# Patient Record
Sex: MALE | Race: WHITE | NOT HISPANIC OR LATINO | Employment: OTHER | URBAN - METROPOLITAN AREA
[De-identification: names, ages, dates, MRNs, and addresses within clinical notes are randomized per-mention and may not be internally consistent; named-entity substitution may affect disease eponyms.]

---

## 2017-02-09 ENCOUNTER — OFFICE VISIT (OUTPATIENT)
Dept: FAMILY MEDICINE CLINIC | Facility: CLINIC | Age: 57
End: 2017-02-09

## 2017-02-09 VITALS
DIASTOLIC BLOOD PRESSURE: 76 MMHG | OXYGEN SATURATION: 99 % | SYSTOLIC BLOOD PRESSURE: 122 MMHG | HEART RATE: 77 BPM | HEIGHT: 70 IN | WEIGHT: 213 LBS | BODY MASS INDEX: 30.49 KG/M2 | TEMPERATURE: 97.7 F

## 2017-02-09 DIAGNOSIS — R50.9 FEVER, UNSPECIFIED FEVER CAUSE: Primary | ICD-10-CM

## 2017-02-09 DIAGNOSIS — J06.9 ACUTE URI: ICD-10-CM

## 2017-02-09 LAB
EXPIRATION DATE: NORMAL
FLUAV AG NPH QL: NEGATIVE
FLUBV AG NPH QL: NEGATIVE
INTERNAL CONTROL: NORMAL
Lab: NORMAL

## 2017-02-09 PROCEDURE — 87804 INFLUENZA ASSAY W/OPTIC: CPT | Performed by: FAMILY MEDICINE

## 2017-02-09 PROCEDURE — 99213 OFFICE O/P EST LOW 20 MIN: CPT | Performed by: FAMILY MEDICINE

## 2017-02-09 NOTE — PROGRESS NOTES
"Subjective   Jay Guadalupe is a 56 y.o. male.     Fever    This is a new problem. The current episode started in the past 7 days. The problem occurs intermittently. The problem has been gradually improving. The maximum temperature noted was 99 to 99.9 F. The temperature was taken using an oral thermometer. Associated symptoms include muscle aches (yesterday). Pertinent negatives include no chest pain, coughing or sore throat. He has tried acetaminophen for the symptoms. The treatment provided mild relief.        The following portions of the patient's history were reviewed and updated as appropriate: allergies, current medications, past social history and problem list.    Review of Systems   Constitutional: Positive for fever.   HENT: Positive for postnasal drip. Negative for sore throat.    Respiratory: Negative for cough.    Cardiovascular: Negative for chest pain.   Musculoskeletal: Positive for back pain (low back).       Objective   Visit Vitals   • /76   • Pulse 77   • Temp 97.7 °F (36.5 °C)   • Ht 70\" (177.8 cm)   • Wt 213 lb (96.6 kg)   • SpO2 99%   • BMI 30.56 kg/m2     Physical Exam   Constitutional: He appears well-developed and well-nourished.   HENT:   Right Ear: External ear normal.   Left Ear: External ear normal.   Mouth/Throat: Oropharynx is clear and moist.   Cardiovascular: Normal rate, regular rhythm and normal heart sounds.    Pulmonary/Chest: Effort normal and breath sounds normal.   Nursing note and vitals reviewed.      Assessment/Plan   Problem List Items Addressed This Visit     None      Visit Diagnoses     Fever, unspecified fever cause    -  Primary    Relevant Orders    POC Influenza A / B (Completed)    Acute URI                 "

## 2017-07-21 ENCOUNTER — OFFICE VISIT (OUTPATIENT)
Dept: FAMILY MEDICINE CLINIC | Facility: CLINIC | Age: 57
End: 2017-07-21

## 2017-07-21 VITALS
OXYGEN SATURATION: 98 % | SYSTOLIC BLOOD PRESSURE: 110 MMHG | HEART RATE: 71 BPM | BODY MASS INDEX: 30.09 KG/M2 | DIASTOLIC BLOOD PRESSURE: 76 MMHG | WEIGHT: 210.2 LBS | HEIGHT: 70 IN | RESPIRATION RATE: 16 BRPM

## 2017-07-21 DIAGNOSIS — M62.08 DIASTASIS OF RECTUS ABDOMINIS: Primary | ICD-10-CM

## 2017-07-21 PROBLEM — N41.9 INFLAMMATORY DISEASE OF PROSTATE: Status: ACTIVE | Noted: 2017-07-21

## 2017-07-21 PROBLEM — G47.00 CANNOT SLEEP: Status: ACTIVE | Noted: 2017-07-21

## 2017-07-21 PROCEDURE — 99213 OFFICE O/P EST LOW 20 MIN: CPT | Performed by: FAMILY MEDICINE

## 2017-07-22 NOTE — PROGRESS NOTES
"Subjective   Jay Guadalupe is a 57 y.o. male.     Hernia   This is a new problem. The current episode started more than 1 month ago. The problem occurs intermittently. The problem has been unchanged. Pertinent negatives include no abdominal pain, arthralgias, chills, coughing, fever, headaches, myalgias, nausea, rash or sore throat. Exacerbated by: Sitting up or flexing the abdomen. He has tried nothing for the symptoms.    Jay has noticed a bulging of the abdomen in the midline when he sits up from bed or flexes his abdomen, or is no pain no change in bowel habits.    The following portions of the patient's history were reviewed and updated as appropriate: allergies, current medications, past social history and problem list.    Review of Systems   Constitutional: Negative.  Negative for chills and fever.   HENT: Negative for ear pain, hearing loss, postnasal drip and sore throat.    Eyes: Negative for discharge, redness and visual disturbance.   Respiratory: Negative for apnea, cough, shortness of breath and wheezing.    Gastrointestinal: Negative for abdominal pain, blood in stool, constipation, diarrhea and nausea.   Genitourinary: Negative for dysuria and hematuria.   Musculoskeletal: Negative for arthralgias, back pain, gait problem, myalgias and neck stiffness.   Skin: Negative for rash.   Allergic/Immunologic: Negative for environmental allergies and food allergies.   Neurological: Negative for dizziness, tremors, seizures, light-headedness and headaches.   Hematological: Negative for adenopathy. Does not bruise/bleed easily.   Psychiatric/Behavioral: Negative for behavioral problems, confusion, decreased concentration, dysphoric mood and sleep disturbance. The patient is not nervous/anxious.        Objective   /76  Pulse 71  Resp 16  Ht 70\" (177.8 cm)  Wt 210 lb 3.2 oz (95.3 kg)  SpO2 98%  BMI 30.16 kg/m2  Physical Exam   Constitutional: He appears well-developed and well-nourished.   HENT: "   Head: Normocephalic and atraumatic.   Eyes: Conjunctivae are normal. Pupils are equal, round, and reactive to light.   Neck: Neck supple.   Cardiovascular: Normal rate and regular rhythm.    Pulmonary/Chest: Effort normal and breath sounds normal.   Abdominal: Soft. Bowel sounds are normal.   Past stasis of the rectus muscles easily reduced palpable masses active bowel sounds   Nursing note and vitals reviewed.      Assessment/Plan   Problem List Items Addressed This Visit        Other    Diastasis of rectus abdominis - Primary          No orders of the defined types were placed in this encounter.    Reassured this is a benign condition and I recommend weight loss of 10-15 pounds and if becomes symptomatic be glad to refer him for surgical evaluation.  Otherwise asked him to return for a physical exam at a later date.

## 2017-09-19 ENCOUNTER — OFFICE VISIT (OUTPATIENT)
Dept: FAMILY MEDICINE CLINIC | Facility: CLINIC | Age: 57
End: 2017-09-19

## 2017-09-19 VITALS
SYSTOLIC BLOOD PRESSURE: 118 MMHG | OXYGEN SATURATION: 98 % | HEART RATE: 75 BPM | RESPIRATION RATE: 16 BRPM | BODY MASS INDEX: 29.89 KG/M2 | HEIGHT: 70 IN | DIASTOLIC BLOOD PRESSURE: 78 MMHG | WEIGHT: 208.8 LBS

## 2017-09-19 DIAGNOSIS — L03.031 CELLULITIS OF TOE OF RIGHT FOOT: Primary | ICD-10-CM

## 2017-09-19 PROCEDURE — 99213 OFFICE O/P EST LOW 20 MIN: CPT | Performed by: FAMILY MEDICINE

## 2017-09-19 RX ORDER — CEPHALEXIN 500 MG/1
500 CAPSULE ORAL 3 TIMES DAILY
Qty: 30 CAPSULE | Refills: 0 | Status: SHIPPED | OUTPATIENT
Start: 2017-09-19 | End: 2018-01-22

## 2017-09-19 NOTE — PROGRESS NOTES
"Subjective   Jay Guadalupe is a 57 y.o. male.     Toe Pain    The incident occurred 5 to 7 days ago. There was no injury mechanism. The pain is present in the left toes. The quality of the pain is described as aching. The pain is moderate. The pain has been fluctuating since onset. Pertinent negatives include no inability to bear weight, numbness or tingling. He has tried elevation for the symptoms. The treatment provided no relief.        The following portions of the patient's history were reviewed and updated as appropriate: allergies, current medications, past social history and problem list.    Review of Systems   HENT: Negative for congestion, sinus pressure and sore throat.    Respiratory: Negative for cough and shortness of breath.    Cardiovascular: Negative for chest pain and leg swelling.   Skin: Positive for color change and rash.   Neurological: Negative for tingling and numbness.       Objective   /78  Pulse 75  Resp 16  Ht 70\" (177.8 cm)  Wt 208 lb 12.8 oz (94.7 kg)  SpO2 98%  BMI 29.96 kg/m2  Physical Exam   Constitutional: He appears well-developed and well-nourished.   HENT:   Head: Normocephalic and atraumatic.   Eyes: Conjunctivae are normal. Pupils are equal, round, and reactive to light.   Neck: Neck supple.   Cardiovascular: Normal rate and regular rhythm.    Pulmonary/Chest: Effort normal and breath sounds normal.   Skin:   Swelling and redness at the base of the great toenail there is some pus formation in the corner no drainage or red streaks   Nursing note and vitals reviewed.      Assessment/Plan   Problem List Items Addressed This Visit     None      Visit Diagnoses     Cellulitis of toe of right foot    -  Primary          New Medications Ordered This Visit   Medications   • cephalexin (KEFLEX) 500 MG capsule     Sig: Take 1 capsule by mouth 3 (Three) Times a Day.     Dispense:  30 capsule     Refill:  0       Warm soaks twice a day return to clinic if not improving in 2-3 " days for possible incision and drainage.

## 2018-01-22 ENCOUNTER — OFFICE VISIT (OUTPATIENT)
Dept: FAMILY MEDICINE CLINIC | Facility: CLINIC | Age: 58
End: 2018-01-22

## 2018-01-22 VITALS — HEART RATE: 72 BPM | DIASTOLIC BLOOD PRESSURE: 80 MMHG | SYSTOLIC BLOOD PRESSURE: 120 MMHG

## 2018-01-22 DIAGNOSIS — J40 BRONCHITIS: Primary | ICD-10-CM

## 2018-01-22 PROCEDURE — 99213 OFFICE O/P EST LOW 20 MIN: CPT | Performed by: FAMILY MEDICINE

## 2018-01-22 RX ORDER — BENZONATATE 200 MG/1
200 CAPSULE ORAL 3 TIMES DAILY PRN
Qty: 30 CAPSULE | Refills: 0 | Status: ON HOLD | OUTPATIENT
Start: 2018-01-22 | End: 2021-04-29

## 2018-01-22 RX ORDER — DOXYCYCLINE HYCLATE 100 MG/1
100 CAPSULE ORAL 2 TIMES DAILY
Qty: 20 CAPSULE | Refills: 0 | Status: ON HOLD | OUTPATIENT
Start: 2018-01-22 | End: 2021-04-29

## 2018-01-22 RX ORDER — FLUTICASONE PROPIONATE 110 UG/1
1 AEROSOL, METERED RESPIRATORY (INHALATION)
Qty: 1 INHALER | Refills: 0 | Status: ON HOLD | OUTPATIENT
Start: 2018-01-22 | End: 2021-04-29

## 2018-01-22 NOTE — PROGRESS NOTES
Subjective   Jay Guadalupe is a 57 y.o. male.     Cough   This is a new problem. The current episode started more than 1 month ago. The problem has been unchanged. The problem occurs every few minutes. The cough is productive of sputum. Pertinent negatives include no chest pain, chills, fever, hemoptysis, sore throat, shortness of breath or wheezing. He has tried OTC cough suppressant for the symptoms. The treatment provided no relief. His past medical history is significant for bronchitis. There is no history of environmental allergies.      Call for this persisted for 6 weeks.  Productive at times the cough so little worse at night no fever chills sweats no purulent sputum.  The following portions of the patient's history were reviewed and updated as appropriate: allergies, current medications, past social history and problem list.    Review of Systems   Constitutional: Negative for chills, fatigue and fever.   HENT: Negative.  Negative for congestion and sore throat.    Respiratory: Positive for cough. Negative for hemoptysis, chest tightness, shortness of breath and wheezing.    Cardiovascular: Negative for chest pain.   Gastrointestinal: Negative for nausea.   Genitourinary: Negative for dysuria and hematuria.   Musculoskeletal: Negative for arthralgias.   Allergic/Immunologic: Negative for environmental allergies and food allergies.       Objective   /80  Pulse 72  Physical Exam   Constitutional: He is oriented to person, place, and time. He appears well-developed and well-nourished. He is cooperative.   HENT:   Head: Normocephalic.   Right Ear: External ear normal.   Left Ear: External ear normal.   Nose: Nose normal.   Mouth/Throat: Oropharynx is clear and moist.   Clear postnasal drip   Eyes: Conjunctivae are normal. Pupils are equal, round, and reactive to light. No scleral icterus.   Neck: Neck supple. Carotid bruit is not present. No thyromegaly present.   Cardiovascular: Normal rate, regular  rhythm and normal heart sounds.    Pulmonary/Chest: Effort normal.   Dry staccato upper airway rhonchi   Abdominal: There is no hepatosplenomegaly.   Musculoskeletal: Normal range of motion.   Lymphadenopathy:     He has no cervical adenopathy.   Neurological: He is alert and oriented to person, place, and time.   No focal deficits no lateralizing signs   Skin: Skin is warm and dry. No rash noted.   Psychiatric: He has a normal mood and affect. Cognition and memory are normal.   Nursing note and vitals reviewed.      Assessment/Plan   Problem List Items Addressed This Visit     None      Visit Diagnoses     Bronchitis    -  Primary    Relevant Medications    benzonatate (TESSALON) 200 MG capsule    fluticasone (FLOVENT HFA) 110 MCG/ACT inhaler          New Medications Ordered This Visit   Medications   • doxycycline (VIBRAMYCIN) 100 MG capsule     Sig: Take 1 capsule by mouth 2 (Two) Times a Day.     Dispense:  20 capsule     Refill:  0   • benzonatate (TESSALON) 200 MG capsule     Sig: Take 1 capsule by mouth 3 (Three) Times a Day As Needed for Cough.     Dispense:  30 capsule     Refill:  0   • fluticasone (FLOVENT HFA) 110 MCG/ACT inhaler     Sig: Inhale 1 puff 2 (Two) Times a Day. Rinse mouth after use     Dispense:  1 inhaler     Refill:  0     Return to clinic if symptoms persist may need to consider chest x-ray and further workup.

## 2019-11-24 ENCOUNTER — CLINICAL SUPPORT (OUTPATIENT)
Dept: FAMILY MEDICINE CLINIC | Facility: CLINIC | Age: 59
End: 2019-11-24

## 2019-11-24 DIAGNOSIS — Z23 NEED FOR VACCINATION: Primary | ICD-10-CM

## 2019-11-24 PROCEDURE — 90471 IMMUNIZATION ADMIN: CPT | Performed by: FAMILY MEDICINE

## 2019-11-24 PROCEDURE — 90472 IMMUNIZATION ADMIN EACH ADD: CPT | Performed by: FAMILY MEDICINE

## 2019-11-24 PROCEDURE — 90674 CCIIV4 VAC NO PRSV 0.5 ML IM: CPT | Performed by: FAMILY MEDICINE

## 2019-11-24 PROCEDURE — 90632 HEPA VACCINE ADULT IM: CPT | Performed by: FAMILY MEDICINE

## 2020-08-03 ENCOUNTER — APPOINTMENT (OUTPATIENT)
Dept: PREADMISSION TESTING | Facility: HOSPITAL | Age: 60
End: 2020-08-03

## 2020-08-07 ENCOUNTER — APPOINTMENT (OUTPATIENT)
Dept: PREADMISSION TESTING | Facility: HOSPITAL | Age: 60
End: 2020-08-07

## 2021-04-29 ENCOUNTER — HOSPITAL ENCOUNTER (INPATIENT)
Facility: HOSPITAL | Age: 61
LOS: 1 days | Discharge: HOME | End: 2021-04-30
Attending: INTERNAL MEDICINE | Admitting: INTERNAL MEDICINE

## 2021-04-29 DIAGNOSIS — I21.21 ST ELEVATION MYOCARDIAL INFARCTION INVOLVING LEFT CIRCUMFLEX CORONARY ARTERY (HCC): Primary | ICD-10-CM

## 2021-04-29 PROBLEM — E78.5 DYSLIPIDEMIA: Status: ACTIVE | Noted: 2021-04-29

## 2021-04-29 PROBLEM — I21.3 STEMI (ST ELEVATION MYOCARDIAL INFARCTION): Status: ACTIVE | Noted: 2021-04-29

## 2021-04-29 PROBLEM — I25.810 CAD (CORONARY ARTERY DISEASE) OF ARTERY BYPASS GRAFT: Status: ACTIVE | Noted: 2021-04-29

## 2021-04-29 PROBLEM — G47.00 CANNOT SLEEP: Status: RESOLVED | Noted: 2017-07-21 | Resolved: 2021-04-29

## 2021-04-29 LAB
ALBUMIN SERPL-MCNC: 3.7 G/DL (ref 3.5–5.2)
ALBUMIN/GLOB SERPL: 1.5 G/DL
ALP SERPL-CCNC: 74 U/L (ref 39–117)
ALT SERPL W P-5'-P-CCNC: 23 U/L (ref 1–41)
ANION GAP SERPL CALCULATED.3IONS-SCNC: 6 MMOL/L (ref 5–15)
AST SERPL-CCNC: 61 U/L (ref 1–40)
BILIRUB SERPL-MCNC: 0.3 MG/DL (ref 0–1.2)
BUN SERPL-MCNC: 16 MG/DL (ref 8–23)
BUN/CREAT SERPL: 16.3 (ref 7–25)
CALCIUM SPEC-SCNC: 8.4 MG/DL (ref 8.6–10.5)
CHLORIDE SERPL-SCNC: 105 MMOL/L (ref 98–107)
CHOLEST SERPL-MCNC: 150 MG/DL (ref 0–200)
CO2 SERPL-SCNC: 24 MMOL/L (ref 22–29)
CREAT BLDA-MCNC: 0.8 MG/DL (ref 0.6–1.3)
CREAT SERPL-MCNC: 0.98 MG/DL (ref 0.76–1.27)
GFR SERPL CREATININE-BSD FRML MDRD: 78 ML/MIN/1.73
GLOBULIN UR ELPH-MCNC: 2.5 GM/DL
GLUCOSE SERPL-MCNC: 121 MG/DL (ref 65–99)
HBA1C MFR BLD: 5 % (ref 4.8–5.6)
HDLC SERPL-MCNC: 35 MG/DL (ref 40–60)
LDLC SERPL CALC-MCNC: 108 MG/DL (ref 0–100)
LDLC/HDLC SERPL: 3.14 {RATIO}
MAGNESIUM SERPL-MCNC: 2 MG/DL (ref 1.6–2.4)
POTASSIUM SERPL-SCNC: 4.9 MMOL/L (ref 3.5–5.2)
PROT SERPL-MCNC: 6.2 G/DL (ref 6–8.5)
SARS-COV-2 RDRP RESP QL NAA+PROBE: NORMAL
SODIUM SERPL-SCNC: 135 MMOL/L (ref 136–145)
TRIGL SERPL-MCNC: 26 MG/DL (ref 0–150)
TROPONIN T SERPL-MCNC: 1.04 NG/ML (ref 0–0.03)
VLDLC SERPL-MCNC: 7 MG/DL (ref 5–40)

## 2021-04-29 PROCEDURE — C1887 CATHETER, GUIDING: HCPCS | Performed by: INTERNAL MEDICINE

## 2021-04-29 PROCEDURE — 82565 ASSAY OF CREATININE: CPT

## 2021-04-29 PROCEDURE — 92928 PRQ TCAT PLMT NTRAC ST 1 LES: CPT | Performed by: INTERNAL MEDICINE

## 2021-04-29 PROCEDURE — C1894 INTRO/SHEATH, NON-LASER: HCPCS | Performed by: INTERNAL MEDICINE

## 2021-04-29 PROCEDURE — C1757 CATH, THROMBECTOMY/EMBOLECT: HCPCS | Performed by: INTERNAL MEDICINE

## 2021-04-29 PROCEDURE — 25010000002 MIDAZOLAM PER 1 MG: Performed by: INTERNAL MEDICINE

## 2021-04-29 PROCEDURE — 25010000002 CANGRELOR TETRASODIUM 50 MG RECONSTITUTED SOLUTION 1 EACH VIAL: Performed by: INTERNAL MEDICINE

## 2021-04-29 PROCEDURE — C1874 STENT, COATED/COV W/DEL SYS: HCPCS | Performed by: INTERNAL MEDICINE

## 2021-04-29 PROCEDURE — 83735 ASSAY OF MAGNESIUM: CPT | Performed by: INTERNAL MEDICINE

## 2021-04-29 PROCEDURE — 25010000002 FENTANYL CITRATE (PF) 100 MCG/2ML SOLUTION: Performed by: INTERNAL MEDICINE

## 2021-04-29 PROCEDURE — C9460 INJECTION, CANGRELOR: HCPCS | Performed by: INTERNAL MEDICINE

## 2021-04-29 PROCEDURE — C1725 CATH, TRANSLUMIN NON-LASER: HCPCS | Performed by: INTERNAL MEDICINE

## 2021-04-29 PROCEDURE — 93458 L HRT ARTERY/VENTRICLE ANGIO: CPT | Performed by: INTERNAL MEDICINE

## 2021-04-29 PROCEDURE — 80053 COMPREHEN METABOLIC PANEL: CPT | Performed by: INTERNAL MEDICINE

## 2021-04-29 PROCEDURE — 4A023N7 MEASUREMENT OF CARDIAC SAMPLING AND PRESSURE, LEFT HEART, PERCUTANEOUS APPROACH: ICD-10-PCS | Performed by: INTERNAL MEDICINE

## 2021-04-29 PROCEDURE — 027135Z DILATION OF CORONARY ARTERY, TWO ARTERIES WITH TWO DRUG-ELUTING INTRALUMINAL DEVICES, PERCUTANEOUS APPROACH: ICD-10-PCS | Performed by: INTERNAL MEDICINE

## 2021-04-29 PROCEDURE — 02C03ZZ EXTIRPATION OF MATTER FROM CORONARY ARTERY, ONE ARTERY, PERCUTANEOUS APPROACH: ICD-10-PCS | Performed by: INTERNAL MEDICINE

## 2021-04-29 PROCEDURE — B2151ZZ FLUOROSCOPY OF LEFT HEART USING LOW OSMOLAR CONTRAST: ICD-10-PCS | Performed by: INTERNAL MEDICINE

## 2021-04-29 PROCEDURE — C1769 GUIDE WIRE: HCPCS | Performed by: INTERNAL MEDICINE

## 2021-04-29 PROCEDURE — 83036 HEMOGLOBIN GLYCOSYLATED A1C: CPT | Performed by: INTERNAL MEDICINE

## 2021-04-29 PROCEDURE — 93005 ELECTROCARDIOGRAM TRACING: CPT | Performed by: INTERNAL MEDICINE

## 2021-04-29 PROCEDURE — 92941 PRQ TRLML REVSC TOT OCCL AMI: CPT | Performed by: INTERNAL MEDICINE

## 2021-04-29 PROCEDURE — 80061 LIPID PANEL: CPT | Performed by: INTERNAL MEDICINE

## 2021-04-29 PROCEDURE — 25010000002 HEPARIN (PORCINE) PER 1000 UNITS: Performed by: INTERNAL MEDICINE

## 2021-04-29 PROCEDURE — 99223 1ST HOSP IP/OBS HIGH 75: CPT | Performed by: INTERNAL MEDICINE

## 2021-04-29 PROCEDURE — C9600 PERC DRUG-EL COR STENT SING: HCPCS | Performed by: INTERNAL MEDICINE

## 2021-04-29 PROCEDURE — C1760 CLOSURE DEV, VASC: HCPCS | Performed by: INTERNAL MEDICINE

## 2021-04-29 PROCEDURE — 87635 SARS-COV-2 COVID-19 AMP PRB: CPT | Performed by: INTERNAL MEDICINE

## 2021-04-29 PROCEDURE — 84484 ASSAY OF TROPONIN QUANT: CPT | Performed by: INTERNAL MEDICINE

## 2021-04-29 PROCEDURE — 0 IOPAMIDOL PER 1 ML: Performed by: INTERNAL MEDICINE

## 2021-04-29 PROCEDURE — B2111ZZ FLUOROSCOPY OF MULTIPLE CORONARY ARTERIES USING LOW OSMOLAR CONTRAST: ICD-10-PCS | Performed by: INTERNAL MEDICINE

## 2021-04-29 PROCEDURE — C9606 PERC D-E COR REVASC W AMI S: HCPCS | Performed by: INTERNAL MEDICINE

## 2021-04-29 DEVICE — XIENCE SIERRA™ EVEROLIMUS ELUTING CORONARY STENT SYSTEM 2.25 MM X 23 MM / RAPID-EXCHANGE
Type: IMPLANTABLE DEVICE | Status: FUNCTIONAL
Brand: XIENCE SIERRA™

## 2021-04-29 DEVICE — XIENCE SIERRA™ EVEROLIMUS ELUTING CORONARY STENT SYSTEM 3.00 MM X 23 MM / RAPID-EXCHANGE
Type: IMPLANTABLE DEVICE | Status: FUNCTIONAL
Brand: XIENCE SIERRA™

## 2021-04-29 RX ORDER — FENTANYL CITRATE 50 UG/ML
INJECTION, SOLUTION INTRAMUSCULAR; INTRAVENOUS AS NEEDED
Status: DISCONTINUED | OUTPATIENT
Start: 2021-04-29 | End: 2021-04-29 | Stop reason: HOSPADM

## 2021-04-29 RX ORDER — ONDANSETRON 2 MG/ML
4 INJECTION INTRAMUSCULAR; INTRAVENOUS EVERY 6 HOURS PRN
Status: DISCONTINUED | OUTPATIENT
Start: 2021-04-29 | End: 2021-04-30 | Stop reason: HOSPADM

## 2021-04-29 RX ORDER — ACETAMINOPHEN 325 MG/1
650 TABLET ORAL EVERY 4 HOURS PRN
Status: DISCONTINUED | OUTPATIENT
Start: 2021-04-29 | End: 2021-04-30 | Stop reason: HOSPADM

## 2021-04-29 RX ORDER — ALUMINA, MAGNESIA, AND SIMETHICONE 2400; 2400; 240 MG/30ML; MG/30ML; MG/30ML
15 SUSPENSION ORAL EVERY 6 HOURS PRN
Status: DISCONTINUED | OUTPATIENT
Start: 2021-04-29 | End: 2021-04-30 | Stop reason: HOSPADM

## 2021-04-29 RX ORDER — ONDANSETRON 4 MG/1
4 TABLET, FILM COATED ORAL EVERY 6 HOURS PRN
Status: DISCONTINUED | OUTPATIENT
Start: 2021-04-29 | End: 2021-04-30 | Stop reason: HOSPADM

## 2021-04-29 RX ORDER — SODIUM CHLORIDE 9 MG/ML
100 INJECTION, SOLUTION INTRAVENOUS CONTINUOUS
Status: ACTIVE | OUTPATIENT
Start: 2021-04-29 | End: 2021-04-29

## 2021-04-29 RX ORDER — MIDAZOLAM HYDROCHLORIDE 1 MG/ML
INJECTION INTRAMUSCULAR; INTRAVENOUS AS NEEDED
Status: DISCONTINUED | OUTPATIENT
Start: 2021-04-29 | End: 2021-04-29 | Stop reason: HOSPADM

## 2021-04-29 RX ORDER — HEPARIN SODIUM 1000 [USP'U]/ML
INJECTION, SOLUTION INTRAVENOUS; SUBCUTANEOUS AS NEEDED
Status: DISCONTINUED | OUTPATIENT
Start: 2021-04-29 | End: 2021-04-29 | Stop reason: HOSPADM

## 2021-04-29 RX ORDER — LIDOCAINE HYDROCHLORIDE 10 MG/ML
INJECTION, SOLUTION EPIDURAL; INFILTRATION; INTRACAUDAL; PERINEURAL AS NEEDED
Status: DISCONTINUED | OUTPATIENT
Start: 2021-04-29 | End: 2021-04-29 | Stop reason: HOSPADM

## 2021-04-29 RX ORDER — ALPRAZOLAM 0.25 MG/1
0.25 TABLET ORAL 3 TIMES DAILY PRN
Status: DISCONTINUED | OUTPATIENT
Start: 2021-04-29 | End: 2021-04-30 | Stop reason: HOSPADM

## 2021-04-29 RX ORDER — ASPIRIN 81 MG/1
81 TABLET, CHEWABLE ORAL DAILY
Status: DISCONTINUED | OUTPATIENT
Start: 2021-04-29 | End: 2021-04-30 | Stop reason: HOSPADM

## 2021-04-29 RX ADMIN — SODIUM CHLORIDE 100 ML/HR: 9 INJECTION, SOLUTION INTRAVENOUS at 14:24

## 2021-04-29 RX ADMIN — TICAGRELOR 90 MG: 90 TABLET ORAL at 20:24

## 2021-04-29 RX ADMIN — METOPROLOL TARTRATE 12.5 MG: 25 TABLET, FILM COATED ORAL at 17:09

## 2021-04-29 RX ADMIN — ASPIRIN 81 MG: 81 TABLET, CHEWABLE ORAL at 14:25

## 2021-04-30 ENCOUNTER — NURSE TRIAGE (OUTPATIENT)
Dept: CALL CENTER | Facility: HOSPITAL | Age: 61
End: 2021-04-30

## 2021-04-30 ENCOUNTER — READMISSION MANAGEMENT (OUTPATIENT)
Dept: CALL CENTER | Facility: HOSPITAL | Age: 61
End: 2021-04-30

## 2021-04-30 ENCOUNTER — TRANSCRIBE ORDERS (OUTPATIENT)
Dept: CARDIAC REHAB | Facility: HOSPITAL | Age: 61
End: 2021-04-30

## 2021-04-30 ENCOUNTER — APPOINTMENT (OUTPATIENT)
Dept: CARDIOLOGY | Facility: HOSPITAL | Age: 61
End: 2021-04-30

## 2021-04-30 VITALS
DIASTOLIC BLOOD PRESSURE: 63 MMHG | OXYGEN SATURATION: 99 % | SYSTOLIC BLOOD PRESSURE: 105 MMHG | TEMPERATURE: 97.8 F | WEIGHT: 185 LBS | RESPIRATION RATE: 18 BRPM | HEART RATE: 62 BPM | HEIGHT: 70 IN | BODY MASS INDEX: 26.48 KG/M2

## 2021-04-30 DIAGNOSIS — I21.3 ST ELEVATION MYOCARDIAL INFARCTION (STEMI), UNSPECIFIED ARTERY (HCC): Primary | ICD-10-CM

## 2021-04-30 PROBLEM — E78.2 MIXED HYPERLIPIDEMIA: Status: ACTIVE | Noted: 2021-04-30

## 2021-04-30 LAB
ANION GAP SERPL CALCULATED.3IONS-SCNC: 9 MMOL/L (ref 5–15)
ASCENDING AORTA: 3 CM
BH CV ECHO MEAS - AO MAX PG (FULL): 3 MMHG
BH CV ECHO MEAS - AO MAX PG: 6.7 MMHG
BH CV ECHO MEAS - AO MEAN PG (FULL): 1.5 MMHG
BH CV ECHO MEAS - AO MEAN PG: 3.3 MMHG
BH CV ECHO MEAS - AO ROOT AREA (BSA CORRECTED): 1.7
BH CV ECHO MEAS - AO ROOT AREA: 9.7 CM^2
BH CV ECHO MEAS - AO ROOT DIAM: 3.5 CM
BH CV ECHO MEAS - AO V2 MAX: 129.2 CM/SEC
BH CV ECHO MEAS - AO V2 MEAN: 86 CM/SEC
BH CV ECHO MEAS - AO V2 VTI: 27.5 CM
BH CV ECHO MEAS - ASC AORTA: 3 CM
BH CV ECHO MEAS - AVA(I,A): 2.6 CM^2
BH CV ECHO MEAS - AVA(I,D): 2.6 CM^2
BH CV ECHO MEAS - AVA(V,A): 2.5 CM^2
BH CV ECHO MEAS - AVA(V,D): 2.5 CM^2
BH CV ECHO MEAS - BSA(HAYCOCK): 2 M^2
BH CV ECHO MEAS - BSA: 2 M^2
BH CV ECHO MEAS - BZI_BMI: 26.5 KILOGRAMS/M^2
BH CV ECHO MEAS - BZI_METRIC_HEIGHT: 177.8 CM
BH CV ECHO MEAS - BZI_METRIC_WEIGHT: 83.9 KG
BH CV ECHO MEAS - EDV(CUBED): 63.1 ML
BH CV ECHO MEAS - EDV(MOD-SP2): 74 ML
BH CV ECHO MEAS - EDV(MOD-SP4): 81 ML
BH CV ECHO MEAS - EDV(TEICH): 69.2 ML
BH CV ECHO MEAS - EF(CUBED): 37 %
BH CV ECHO MEAS - EF(MOD-SP2): 50 %
BH CV ECHO MEAS - EF(MOD-SP4): 59.3 %
BH CV ECHO MEAS - EF(TEICH): 30.8 %
BH CV ECHO MEAS - ESV(CUBED): 39.8 ML
BH CV ECHO MEAS - ESV(MOD-SP2): 37 ML
BH CV ECHO MEAS - ESV(MOD-SP4): 33 ML
BH CV ECHO MEAS - ESV(TEICH): 47.9 ML
BH CV ECHO MEAS - FS: 14.3 %
BH CV ECHO MEAS - IVS/LVPW: 1
BH CV ECHO MEAS - IVSD: 1.2 CM
BH CV ECHO MEAS - LA DIMENSION: 3.4 CM
BH CV ECHO MEAS - LA/AO: 0.96
BH CV ECHO MEAS - LAD MAJOR: 5.6 CM
BH CV ECHO MEAS - LAT PEAK E' VEL: 12.2 CM/SEC
BH CV ECHO MEAS - LATERAL E/E' RATIO: 6.1
BH CV ECHO MEAS - LV DIASTOLIC VOL/BSA (35-75): 40.1 ML/M^2
BH CV ECHO MEAS - LV MASS(C)D: 165.7 GRAMS
BH CV ECHO MEAS - LV MASS(C)DI: 82.1 GRAMS/M^2
BH CV ECHO MEAS - LV MAX PG: 3.7 MMHG
BH CV ECHO MEAS - LV MEAN PG: 1.9 MMHG
BH CV ECHO MEAS - LV SYSTOLIC VOL/BSA (12-30): 16.3 ML/M^2
BH CV ECHO MEAS - LV V1 MAX: 95.5 CM/SEC
BH CV ECHO MEAS - LV V1 MEAN: 63.2 CM/SEC
BH CV ECHO MEAS - LV V1 VTI: 20.7 CM
BH CV ECHO MEAS - LVIDD: 4 CM
BH CV ECHO MEAS - LVIDS: 3.4 CM
BH CV ECHO MEAS - LVLD AP2: 8.4 CM
BH CV ECHO MEAS - LVLD AP4: 8 CM
BH CV ECHO MEAS - LVLS AP2: 6.5 CM
BH CV ECHO MEAS - LVLS AP4: 6.8 CM
BH CV ECHO MEAS - LVOT AREA (M): 3.5 CM^2
BH CV ECHO MEAS - LVOT AREA: 3.4 CM^2
BH CV ECHO MEAS - LVOT DIAM: 2.1 CM
BH CV ECHO MEAS - LVPWD: 1.2 CM
BH CV ECHO MEAS - MED PEAK E' VEL: 8.4 CM/SEC
BH CV ECHO MEAS - MEDIAL E/E' RATIO: 8.8
BH CV ECHO MEAS - MV A MAX VEL: 53.8 CM/SEC
BH CV ECHO MEAS - MV DEC SLOPE: 221.2 CM/SEC^2
BH CV ECHO MEAS - MV DEC TIME: 0.16 SEC
BH CV ECHO MEAS - MV E MAX VEL: 76 CM/SEC
BH CV ECHO MEAS - MV E/A: 1.4
BH CV ECHO MEAS - MV P1/2T MAX VEL: 81.4 CM/SEC
BH CV ECHO MEAS - MV P1/2T: 107.9 MSEC
BH CV ECHO MEAS - MVA P1/2T LCG: 2.7 CM^2
BH CV ECHO MEAS - MVA(P1/2T): 2 CM^2
BH CV ECHO MEAS - PA ACC SLOPE: 416 CM/SEC^2
BH CV ECHO MEAS - PA ACC TIME: 0.14 SEC
BH CV ECHO MEAS - PA MAX PG: 2.4 MMHG
BH CV ECHO MEAS - PA PR(ACCEL): 15.4 MMHG
BH CV ECHO MEAS - PA V2 MAX: 78.2 CM/SEC
BH CV ECHO MEAS - SI(AO): 132.6 ML/M^2
BH CV ECHO MEAS - SI(CUBED): 11.5 ML/M^2
BH CV ECHO MEAS - SI(LVOT): 34.7 ML/M^2
BH CV ECHO MEAS - SI(MOD-SP2): 18.3 ML/M^2
BH CV ECHO MEAS - SI(MOD-SP4): 23.8 ML/M^2
BH CV ECHO MEAS - SI(TEICH): 10.6 ML/M^2
BH CV ECHO MEAS - SV(AO): 267.8 ML
BH CV ECHO MEAS - SV(CUBED): 23.3 ML
BH CV ECHO MEAS - SV(LVOT): 70.1 ML
BH CV ECHO MEAS - SV(MOD-SP2): 37 ML
BH CV ECHO MEAS - SV(MOD-SP4): 48 ML
BH CV ECHO MEAS - SV(TEICH): 21.3 ML
BH CV ECHO MEAS - TAPSE (>1.6): 1.9 CM
BH CV ECHO MEASUREMENTS AVERAGE E/E' RATIO: 7.38
BH CV XLRA - RV BASE: 3.3 CM
BUN SERPL-MCNC: 12 MG/DL (ref 8–23)
BUN/CREAT SERPL: 13.3 (ref 7–25)
CALCIUM SPEC-SCNC: 8.4 MG/DL (ref 8.6–10.5)
CHLORIDE SERPL-SCNC: 106 MMOL/L (ref 98–107)
CO2 SERPL-SCNC: 24 MMOL/L (ref 22–29)
CREAT SERPL-MCNC: 0.9 MG/DL (ref 0.76–1.27)
DEPRECATED RDW RBC AUTO: 42.5 FL (ref 37–54)
ERYTHROCYTE [DISTWIDTH] IN BLOOD BY AUTOMATED COUNT: 12.4 % (ref 12.3–15.4)
GFR SERPL CREATININE-BSD FRML MDRD: 86 ML/MIN/1.73
GLUCOSE SERPL-MCNC: 107 MG/DL (ref 65–99)
HCT VFR BLD AUTO: 40.8 % (ref 37.5–51)
HGB BLD-MCNC: 13.1 G/DL (ref 13–17.7)
LEFT ATRIUM VOLUME INDEX: 19.8 ML/M^2
LEFT ATRIUM VOLUME: 40 ML
LV EF 2D ECHO EST: 55 %
MAXIMAL PREDICTED HEART RATE: 158 BPM
MCH RBC QN AUTO: 29.8 PG (ref 26.6–33)
MCHC RBC AUTO-ENTMCNC: 32.1 G/DL (ref 31.5–35.7)
MCV RBC AUTO: 92.7 FL (ref 79–97)
PLATELET # BLD AUTO: 224 10*3/MM3 (ref 140–450)
PMV BLD AUTO: 10.3 FL (ref 6–12)
POTASSIUM SERPL-SCNC: 4 MMOL/L (ref 3.5–5.2)
RBC # BLD AUTO: 4.4 10*6/MM3 (ref 4.14–5.8)
SODIUM SERPL-SCNC: 139 MMOL/L (ref 136–145)
STRESS TARGET HR: 134 BPM
TROPONIN T SERPL-MCNC: 2.28 NG/ML (ref 0–0.03)
WBC # BLD AUTO: 10.89 10*3/MM3 (ref 3.4–10.8)

## 2021-04-30 PROCEDURE — 93005 ELECTROCARDIOGRAM TRACING: CPT | Performed by: INTERNAL MEDICINE

## 2021-04-30 PROCEDURE — 85027 COMPLETE CBC AUTOMATED: CPT | Performed by: INTERNAL MEDICINE

## 2021-04-30 PROCEDURE — 84484 ASSAY OF TROPONIN QUANT: CPT | Performed by: INTERNAL MEDICINE

## 2021-04-30 PROCEDURE — 99239 HOSP IP/OBS DSCHRG MGMT >30: CPT | Performed by: INTERNAL MEDICINE

## 2021-04-30 PROCEDURE — 80048 BASIC METABOLIC PNL TOTAL CA: CPT | Performed by: INTERNAL MEDICINE

## 2021-04-30 PROCEDURE — 93306 TTE W/DOPPLER COMPLETE: CPT | Performed by: INTERNAL MEDICINE

## 2021-04-30 PROCEDURE — 93306 TTE W/DOPPLER COMPLETE: CPT

## 2021-04-30 PROCEDURE — 93010 ELECTROCARDIOGRAM REPORT: CPT | Performed by: INTERNAL MEDICINE

## 2021-04-30 RX ORDER — ASPIRIN 81 MG/1
81 TABLET, CHEWABLE ORAL DAILY
Qty: 100 TABLET | Refills: 3 | Status: SHIPPED | OUTPATIENT
Start: 2021-04-30 | End: 2021-05-01

## 2021-04-30 RX ORDER — METOPROLOL SUCCINATE 25 MG/1
12.5 TABLET, EXTENDED RELEASE ORAL ONCE
Status: COMPLETED | OUTPATIENT
Start: 2021-04-30 | End: 2021-04-30

## 2021-04-30 RX ORDER — RAMIPRIL 1.25 MG/1
1.25 CAPSULE ORAL
Qty: 30 CAPSULE | Refills: 5 | Status: SHIPPED | OUTPATIENT
Start: 2021-04-30 | End: 2021-05-01

## 2021-04-30 RX ORDER — ROSUVASTATIN CALCIUM 20 MG/1
20 TABLET, COATED ORAL NIGHTLY
Qty: 90 TABLET | Refills: 1 | Status: SHIPPED | OUTPATIENT
Start: 2021-04-30 | End: 2021-05-01

## 2021-04-30 RX ORDER — METOPROLOL SUCCINATE 25 MG/1
25 TABLET, EXTENDED RELEASE ORAL
Status: DISCONTINUED | OUTPATIENT
Start: 2021-05-01 | End: 2021-04-30 | Stop reason: HOSPADM

## 2021-04-30 RX ORDER — METOPROLOL SUCCINATE 25 MG/1
25 TABLET, EXTENDED RELEASE ORAL
Status: DISCONTINUED | OUTPATIENT
Start: 2021-04-30 | End: 2021-04-30

## 2021-04-30 RX ORDER — NITROGLYCERIN 0.4 MG/1
TABLET SUBLINGUAL
Qty: 100 TABLET | Refills: 1 | Status: SHIPPED | OUTPATIENT
Start: 2021-04-30 | End: 2021-05-01 | Stop reason: SDUPTHER

## 2021-04-30 RX ORDER — ROSUVASTATIN CALCIUM 20 MG/1
20 TABLET, COATED ORAL NIGHTLY
Status: DISCONTINUED | OUTPATIENT
Start: 2021-04-30 | End: 2021-04-30 | Stop reason: HOSPADM

## 2021-04-30 RX ORDER — RAMIPRIL 1.25 MG/1
1.25 CAPSULE ORAL
Status: DISCONTINUED | OUTPATIENT
Start: 2021-04-30 | End: 2021-04-30 | Stop reason: HOSPADM

## 2021-04-30 RX ORDER — METOPROLOL SUCCINATE 25 MG/1
25 TABLET, EXTENDED RELEASE ORAL
Qty: 30 TABLET | Refills: 5 | Status: SHIPPED | OUTPATIENT
Start: 2021-05-01 | End: 2021-05-01

## 2021-04-30 RX ORDER — METOPROLOL SUCCINATE 25 MG/1
25 TABLET, EXTENDED RELEASE ORAL
Qty: 30 TABLET | Refills: 5 | Status: SHIPPED | OUTPATIENT
Start: 2021-04-30 | End: 2021-05-01

## 2021-04-30 RX ADMIN — TICAGRELOR 90 MG: 90 TABLET ORAL at 08:32

## 2021-04-30 RX ADMIN — RAMIPRIL 1.25 MG: 1.25 CAPSULE ORAL at 08:33

## 2021-04-30 RX ADMIN — METOPROLOL SUCCINATE 12.5 MG: 25 TABLET, EXTENDED RELEASE ORAL at 09:20

## 2021-04-30 RX ADMIN — METOPROLOL TARTRATE 12.5 MG: 25 TABLET, FILM COATED ORAL at 06:14

## 2021-04-30 RX ADMIN — ASPIRIN 81 MG: 81 TABLET, CHEWABLE ORAL at 08:33

## 2021-04-30 NOTE — OUTREACH NOTE
Prep Survey      Responses   [facility] patient discharged from?  [facility]   Is LACE score < 7 ?  Yes   Emergency Room discharge w/ pulse ox?  No   Eligibility  TCM   [facility]   Date of Admission  04/29/21   Date of Discharge  04/30/21   Discharge Disposition  Home or Self Care   Discharge diagnosis  Acute STEMI, heart cath & stents   Does the patient have one of the following disease processes/diagnoses(primary or secondary)?  Acute MI (STEMI,NSTEMI)   Does the patient have Home health ordered?  No   Is there a DME ordered?  No   Prep survey completed?  Yes          Makenzie Contreras RN

## 2021-05-01 RX ORDER — ROSUVASTATIN CALCIUM 20 MG/1
20 TABLET, COATED ORAL NIGHTLY
Qty: 90 TABLET | Refills: 1 | Status: SHIPPED | OUTPATIENT
Start: 2021-05-01 | End: 2021-08-06 | Stop reason: SDUPTHER

## 2021-05-01 RX ORDER — ASPIRIN 81 MG/1
81 TABLET, CHEWABLE ORAL DAILY
Qty: 100 TABLET | Refills: 3 | Status: SHIPPED | OUTPATIENT
Start: 2021-05-01

## 2021-05-01 RX ORDER — NITROGLYCERIN 0.4 MG/1
TABLET SUBLINGUAL
Qty: 100 TABLET | Refills: 1 | Status: SHIPPED | OUTPATIENT
Start: 2021-05-01

## 2021-05-01 RX ORDER — METOPROLOL SUCCINATE 25 MG/1
25 TABLET, EXTENDED RELEASE ORAL
Qty: 30 TABLET | Refills: 5 | Status: SHIPPED | OUTPATIENT
Start: 2021-05-01 | End: 2021-05-05 | Stop reason: SINTOL

## 2021-05-01 RX ORDER — RAMIPRIL 1.25 MG/1
1.25 CAPSULE ORAL
Qty: 30 CAPSULE | Refills: 5 | Status: SHIPPED | OUTPATIENT
Start: 2021-05-01 | End: 2021-07-08

## 2021-05-02 ENCOUNTER — HOSPITAL ENCOUNTER (EMERGENCY)
Facility: HOSPITAL | Age: 61
Discharge: HOME | End: 2021-05-02
Attending: EMERGENCY MEDICINE | Admitting: EMERGENCY MEDICINE

## 2021-05-02 VITALS
HEART RATE: 63 BPM | WEIGHT: 187 LBS | TEMPERATURE: 97.8 F | BODY MASS INDEX: 26.77 KG/M2 | RESPIRATION RATE: 18 BRPM | HEIGHT: 70 IN | OXYGEN SATURATION: 100 % | DIASTOLIC BLOOD PRESSURE: 66 MMHG | SYSTOLIC BLOOD PRESSURE: 106 MMHG

## 2021-05-02 DIAGNOSIS — I95.9 HYPOTENSION, UNSPECIFIED HYPOTENSION TYPE: Primary | ICD-10-CM

## 2021-05-02 LAB
ANION GAP SERPL CALCULATED.3IONS-SCNC: 10 MMOL/L (ref 5–15)
BASOPHILS # BLD AUTO: 0.03 10*3/MM3 (ref 0–0.2)
BASOPHILS NFR BLD AUTO: 0.4 % (ref 0–1.5)
BUN SERPL-MCNC: 15 MG/DL (ref 8–23)
BUN/CREAT SERPL: 13.4 (ref 7–25)
CALCIUM SPEC-SCNC: 9.3 MG/DL (ref 8.6–10.5)
CHLORIDE SERPL-SCNC: 105 MMOL/L (ref 98–107)
CO2 SERPL-SCNC: 27 MMOL/L (ref 22–29)
CREAT SERPL-MCNC: 1.12 MG/DL (ref 0.76–1.27)
DEPRECATED RDW RBC AUTO: 43.5 FL (ref 37–54)
EOSINOPHIL # BLD AUTO: 0.09 10*3/MM3 (ref 0–0.4)
EOSINOPHIL NFR BLD AUTO: 1.2 % (ref 0.3–6.2)
ERYTHROCYTE [DISTWIDTH] IN BLOOD BY AUTOMATED COUNT: 12.6 % (ref 12.3–15.4)
GFR SERPL CREATININE-BSD FRML MDRD: 66 ML/MIN/1.73
GLUCOSE SERPL-MCNC: 85 MG/DL (ref 65–99)
HCT VFR BLD AUTO: 43.2 % (ref 37.5–51)
HGB BLD-MCNC: 13.9 G/DL (ref 13–17.7)
HOLD SPECIMEN: NORMAL
IMM GRANULOCYTES # BLD AUTO: 0.01 10*3/MM3 (ref 0–0.05)
IMM GRANULOCYTES NFR BLD AUTO: 0.1 % (ref 0–0.5)
LYMPHOCYTES # BLD AUTO: 1.58 10*3/MM3 (ref 0.7–3.1)
LYMPHOCYTES NFR BLD AUTO: 20.3 % (ref 19.6–45.3)
MCH RBC QN AUTO: 30.1 PG (ref 26.6–33)
MCHC RBC AUTO-ENTMCNC: 32.2 G/DL (ref 31.5–35.7)
MCV RBC AUTO: 93.5 FL (ref 79–97)
MONOCYTES # BLD AUTO: 0.75 10*3/MM3 (ref 0.1–0.9)
MONOCYTES NFR BLD AUTO: 9.6 % (ref 5–12)
NEUTROPHILS NFR BLD AUTO: 5.34 10*3/MM3 (ref 1.7–7)
NEUTROPHILS NFR BLD AUTO: 68.4 % (ref 42.7–76)
NRBC BLD AUTO-RTO: 0 /100 WBC (ref 0–0.2)
PLATELET # BLD AUTO: 237 10*3/MM3 (ref 140–450)
PMV BLD AUTO: 10 FL (ref 6–12)
POTASSIUM SERPL-SCNC: 4.5 MMOL/L (ref 3.5–5.2)
RBC # BLD AUTO: 4.62 10*6/MM3 (ref 4.14–5.8)
SODIUM SERPL-SCNC: 142 MMOL/L (ref 136–145)
WBC # BLD AUTO: 7.8 10*3/MM3 (ref 3.4–10.8)
WHOLE BLOOD HOLD SPECIMEN: NORMAL
WHOLE BLOOD HOLD SPECIMEN: NORMAL

## 2021-05-02 PROCEDURE — 85025 COMPLETE CBC W/AUTO DIFF WBC: CPT | Performed by: EMERGENCY MEDICINE

## 2021-05-02 PROCEDURE — 80048 BASIC METABOLIC PNL TOTAL CA: CPT | Performed by: EMERGENCY MEDICINE

## 2021-05-02 PROCEDURE — 99283 EMERGENCY DEPT VISIT LOW MDM: CPT

## 2021-05-02 NOTE — ED PROVIDER NOTES
Subjective   Pt presents with hypotension.  He was previously healthy with no known medical problems, suffered a STEMI on 4/29 with RCA stent placed.  He was initated at that time on ASA, Brilinta, Crestor, plus low dose metoprolol (25 mg XL) and ramipril (1.25mg).  He has had some low blood pressures since yesterday, as low as 82/50.  He has some very mild fatigue but denies chest pain, dyspnea, lightheaded/dizziness, palpitations.  He has not had any bleeding anywhere and says his R groin access site looks the same, no excessive bruising.  He feels fine but his wife insisted he come in.  He says his BP tended to run a little low at baseline before the MI.      History provided by:  Patient and spouse      Review of Systems   Respiratory: Negative for shortness of breath.    Cardiovascular: Negative for chest pain and palpitations.   Gastrointestinal: Negative for blood in stool and vomiting.   Neurological: Negative for dizziness, syncope and light-headedness.   All other systems reviewed and are negative.      History reviewed. No pertinent past medical history.  PMH CAD  No Known Allergies    Past Surgical History:   Procedure Laterality Date   • CARDIAC CATHETERIZATION N/A 4/29/2021    Procedure: Left Heart Cath;  Surgeon: Zaira Kerns MD;  Location: [facility] CATH INVASIVE LOCATION;  Service: Cardiovascular;  Laterality: N/A;       History reviewed. No pertinent family history.    Social History     Socioeconomic History   • Marital status:      Spouse name: Not on file   • Number of children: Not on file   • Years of education: Not on file   • Highest education level: Not on file   Tobacco Use   • Smoking status: Never Smoker   • Smokeless tobacco: Never Used   Substance and Sexual Activity   • Alcohol use: No   • Drug use: No           Objective   Physical Exam  Vitals and nursing note reviewed.   Constitutional:       General: He is not in acute distress.     Appearance: Normal appearance. He is not  ill-appearing.   HENT:      Head: Normocephalic and atraumatic.      Mouth/Throat:      Mouth: Mucous membranes are moist.   Eyes:      General: No scleral icterus.        Right eye: No discharge.         Left eye: No discharge.      Conjunctiva/sclera: Conjunctivae normal.   Cardiovascular:      Rate and Rhythm: Normal rate and regular rhythm.      Heart sounds: No murmur heard.     Pulmonary:      Effort: Pulmonary effort is normal. No respiratory distress.      Breath sounds: Normal breath sounds. No wheezing.   Abdominal:      General: Bowel sounds are normal. There is no distension.      Palpations: Abdomen is soft.      Tenderness: There is no abdominal tenderness. There is no guarding or rebound.   Musculoskeletal:         General: No swelling. Normal range of motion.      Cervical back: Normal range of motion and neck supple.   Skin:     General: Skin is warm and dry.      Findings: No rash.   Neurological:      General: No focal deficit present.      Mental Status: He is alert. Mental status is at baseline.   Psychiatric:         Mood and Affect: Mood normal.         Behavior: Behavior normal.         Thought Content: Thought content normal.         Procedures           ED Course         Normal CBC.  Normal BMP.  BPs right around 100 systolic.  Not orthostatic.  No complaints in the ED.  He has cardiology appt in 5 days, I recommend he hold the metoprolol (since XL tab can't be broken in half) until he talks to them.  Discussed need to continue all other meds, in particular DAPT.    Patient stable on serial rechecks.  Discussed findings, concerns, plan of care, expected course, reasons to return and followup.  Provided the opportunity to ask questions.                                    MDM  Number of Diagnoses or Management Options     Amount and/or Complexity of Data Reviewed  Clinical lab tests: ordered and reviewed  Decide to obtain previous medical records or to obtain history from someone other than  the patient: yes  Obtain history from someone other than the patient: yes        Final diagnoses:   Hypotension, unspecified hypotension type       ED Disposition  ED Disposition     ED Disposition Condition Comment    Discharge Stable           Cardiology      As scheduled         Medication List      No changes were made to your prescriptions during this visit.          Romaine Page MD  05/02/21 1400       Romaine Page MD  05/02/21 140

## 2021-05-02 NOTE — DISCHARGE INSTRUCTIONS
Hold your metoprolol until you talk to cardiology.    Check your blood pressure twice daily (morning and evening) and keep a log.  Take this log to your follow up visits to discuss whether medication changes are necessary.

## 2021-05-03 ENCOUNTER — TRANSITIONAL CARE MANAGEMENT TELEPHONE ENCOUNTER (OUTPATIENT)
Dept: CALL CENTER | Facility: HOSPITAL | Age: 61
End: 2021-05-03

## 2021-05-03 NOTE — OUTREACH NOTE
Call Center TCM Note      Responses   [facility] patient discharged from?  [facility]   Does the patient have one of the following disease processes/diagnoses(primary or secondary)?  Acute MI (STEMI,NSTEMI)   TCM attempt successful?  Yes   Call start time  1532   Call end time  1550   Discharge diagnosis  Acute STEMI, heart cath & stents   Is patient permission given to speak with other caregiver?  Yes   List who call center can speak with  Deyanira Guadalupe, spouse   Person spoke with today (if not patient) and relationship  patient   Meds reviewed with patient/caregiver?  Yes   Is the patient having any side effects they believe may be caused by any medication additions or changes?  No   Does the patient have all prescriptions related to this admission filled (includes statins,anticoagulants,HTN meds,anti-arrhythmia meds)  Yes   Is the patient taking all medications as directed (includes completed medication regime)?  Yes   Medication comments  Patient with return ER visit post discharge due to low B/P. Patient advised to hold metoprolol until f/u with cardiology.    Comments regarding appointments  New Patient with MICHELLE De La Paz Friday May 7, 2021 1:45 PM   Does the patient have a primary care provider?   Yes   Does the patient have an appointment with their PCP,cardiologist,or clinic within 7 days of discharge?  Yes   Comments regarding PCP  Hospital Follow Up with Ronny Avilez MD Wednesday May 5, 2021 10:30 AM   Has the patient kept scheduled appointments due by today?  N/A   Has home health visited the patient within 72 hours of discharge?  N/A   Psychosocial issues?  No   Did the patient receive a copy of their discharge instructions?  Yes   Nursing interventions  Reviewed instructions with patient   What is the patient's perception of their health status since discharge?  Improving   Nursing interventions  Nurse provided patient education   Is the patient/caregiver able to teach back signs and  symptoms of when to call for help immediately:  Sudden chest discomfort, Sudden discomfort in arms, back, neck or jaw, Shortness of breath at any time, Sudden sweating or clammy skin, Nausea or vomiting, Dizziness or lightheadedness, Irregular or rapid heart rate   Nursing interventions  Nurse provided patient education   Is the pateint /caregiver able to teach back the importance of cardiac rehab?  Yes   Nursing interventions  Provided education on importance of cardiac rehab   Is the patient/caregiver able to teach back lifestyle changes to help prevent MIs  Regular exercise as approved by provider, Heart healthy diet, Maintaining a healthy weight, Reducing stress, Limiting alcohol intake   Is the patient/caregiver able to teach back ways to prevent a second heart attack:  Take medications, Follow up with MD, Participate in Cardiac Rehab, Manage risk factors   If the patient is a current smoker, are they able to teach back resources for cessation?  Not a smoker   Is the patient/caregiver able to teach back the hierarchy of who to call/visit for symptoms/problems? PCP, Specialist, Home health nurse, Urgent Care, ED, 911  Yes   TCM call completed?  Yes          Lynn Kelsey RN    5/3/2021, 15:50 EDT

## 2021-05-05 ENCOUNTER — OFFICE VISIT (OUTPATIENT)
Dept: FAMILY MEDICINE CLINIC | Facility: CLINIC | Age: 61
End: 2021-05-05

## 2021-05-05 VITALS
DIASTOLIC BLOOD PRESSURE: 60 MMHG | HEIGHT: 70 IN | BODY MASS INDEX: 27.2 KG/M2 | TEMPERATURE: 98 F | WEIGHT: 190 LBS | SYSTOLIC BLOOD PRESSURE: 110 MMHG | HEART RATE: 75 BPM | OXYGEN SATURATION: 99 %

## 2021-05-05 DIAGNOSIS — I25.10 CORONARY ARTERY DISEASE INVOLVING NATIVE CORONARY ARTERY OF NATIVE HEART WITHOUT ANGINA PECTORIS: Primary | ICD-10-CM

## 2021-05-05 PROCEDURE — 99495 TRANSJ CARE MGMT MOD F2F 14D: CPT | Performed by: FAMILY MEDICINE

## 2021-05-06 ENCOUNTER — TELEPHONE (OUTPATIENT)
Dept: FAMILY MEDICINE CLINIC | Facility: CLINIC | Age: 61
End: 2021-05-06

## 2021-05-06 NOTE — PROGRESS NOTES
Transitional Care Follow Up Visit  Subjective follow up heart stents and bp    Jay Guadalupe is a 60 y.o. male who presents for a transitional care management visit.    Within 48 business hours after discharge our office contacted him via telephone to coordinate his care and needs.      I reviewed and discussed the details of that call along with the discharge summary, hospital problems, inpatient lab results, inpatient diagnostic studies, and consultation reports with Jay.     Current outpatient and discharge medications have been reconciled for the patient.  Reviewed by: Ronny Avilez MD      Date of TCM Phone Call 4/30/2021   [facility]   Date of Admission 4/29/2021   Date of Discharge 4/30/2021   Discharge Disposition Home or Self Care     Risk for Readmission (LACE) Score: 5 (4/30/2021  6:01 AM)      Coronary Artery Disease  Presents for follow-up visit. Pertinent negatives include no chest pain, chest pressure, chest tightness, dizziness, leg swelling or shortness of breath. The symptoms have been resolved. Compliance with diet is good. Compliance with exercise is good. Compliance with medications is good.      Course During Hospital Stay:  Taken to cath lab and had 2 sucessful stents       The following portions of the patient's history were reviewed and updated as appropriate: past family history, past medical history, past social history and problem list.    Review of Systems   Constitutional: Negative for chills and fever.   HENT: Negative for congestion and sore throat.    Eyes: Negative for pain and visual disturbance.   Respiratory: Negative for chest tightness and shortness of breath.    Cardiovascular: Negative for chest pain and leg swelling.   Endocrine: Negative for cold intolerance and heat intolerance.   Genitourinary: Negative for dysuria and hematuria.   Musculoskeletal: Negative for arthralgias and myalgias.   Neurological: Positive for light-headedness. Negative for dizziness  and syncope.        Episode of lightheadedness went to ER and taken off metoprolol       Objective   Physical Exam  Vitals and nursing note reviewed.   Constitutional:       Appearance: Normal appearance. He is well-developed.   HENT:      Head: Normocephalic.      Right Ear: External ear normal.      Left Ear: External ear normal.      Nose: Nose normal.   Eyes:      General: No scleral icterus.     Conjunctiva/sclera: Conjunctivae normal.      Pupils: Pupils are equal, round, and reactive to light.   Neck:      Thyroid: No thyromegaly.      Vascular: No carotid bruit.   Cardiovascular:      Rate and Rhythm: Normal rate and regular rhythm.      Pulses: Normal pulses.      Heart sounds: Normal heart sounds. No murmur heard.   No friction rub. No gallop.    Pulmonary:      Effort: Pulmonary effort is normal.      Breath sounds: Normal breath sounds.   Musculoskeletal:         General: Normal range of motion.      Cervical back: Neck supple.      Right lower leg: No edema.      Left lower leg: No edema.   Skin:     General: Skin is warm and dry.      Findings: Bruising present. No rash.      Comments: Right groin bruising resolving   Neurological:      General: No focal deficit present.      Mental Status: He is alert and oriented to person, place, and time.      Comments: No focal deficits no lateralizing signs   Psychiatric:         Mood and Affect: Mood normal.         Behavior: Behavior is cooperative.         Assessment/Plan   Problems Addressed this Visit        Active Problems    Coronary artery disease involving native coronary artery of native heart without angina pectoris - Primary      Diagnoses       Codes Comments    Coronary artery disease involving native coronary artery of native heart without angina pectoris    -  Primary ICD-10-CM: I25.10  ICD-9-CM: 414.01         Follow up with cardiology and rehab

## 2021-05-06 NOTE — TELEPHONE ENCOUNTER
MELANIA MAGALLANES CALLED STATING SHE IS PATIENTS ASSIGNED CARE COORDINATOR THROUGH PATIENTS INSURANCE [facility].    IF HE NEEDS ANY ASSISTANCE WITH CASE MANAGEMENT SHE IS HIS     MELANIA MAGALLANES  [phone]  CARE COORDINATOR OF [place]     SHE REQUEST THAT HER INFORMATION BE LISTED WITH HIS INSURANCE INFORMATION

## 2021-05-07 ENCOUNTER — OFFICE VISIT (OUTPATIENT)
Dept: CARDIOLOGY | Facility: HOSPITAL | Age: 61
End: 2021-05-07

## 2021-05-07 VITALS
TEMPERATURE: 98.1 F | WEIGHT: 191.38 LBS | SYSTOLIC BLOOD PRESSURE: 91 MMHG | HEIGHT: 70 IN | DIASTOLIC BLOOD PRESSURE: 59 MMHG | HEART RATE: 75 BPM | BODY MASS INDEX: 27.4 KG/M2 | OXYGEN SATURATION: 97 %

## 2021-05-07 DIAGNOSIS — I25.810 CORONARY ARTERY DISEASE INVOLVING CORONARY BYPASS GRAFT OF NATIVE HEART WITHOUT ANGINA PECTORIS: Primary | ICD-10-CM

## 2021-05-07 DIAGNOSIS — I95.2 HYPOTENSION DUE TO DRUGS: ICD-10-CM

## 2021-05-07 DIAGNOSIS — E78.5 DYSLIPIDEMIA: ICD-10-CM

## 2021-05-07 PROCEDURE — 99214 OFFICE O/P EST MOD 30 MIN: CPT | Performed by: NURSE PRACTITIONER

## 2021-05-07 RX ORDER — CHLORAL HYDRATE 500 MG
1000 CAPSULE ORAL DAILY
COMMUNITY
End: 2022-03-11

## 2021-05-07 NOTE — PROGRESS NOTES
"[facility], [facility]    Chief Complaint  Coronary Artery Disease (s/p stent) and Hypotension    Subjective    History of Present Illness {  Problem List  Visit  Diagnosis   Encounters  Notes  Medications  Labs  Result Review Imaging  Media :23}     Jay Guadalupe presents to [facility] for   History of Present Illness     60-year-old male presented to [facility] on 5/2/2021 with low blood pressure.  History of CAD with STEMI and RCA stent placed on 4/29/2020.  Patient currently on aspirin, Brilinta, Crestor.  Metoprolol and ramipril for hypertension.  Patient had reported low blood pressures 80s over 50s with associated fatigue.  Denies chest pain, pressure, dyspnea, dizziness, near syncope, syncope, palpitations.  No significant bruising from incisional site.      Patient was instructed to hold metoprolol.  ED evaluation unremarkable.    Patient's chief complaint is fatigue.  Improved slightly with stopping metoprolol.  Denies chest pain, pressure, dyspnea, dizziness, near syncope, syncope, edema, palpitations.    Blood pressure 90s to low 1 teens off metoprolol.      Objective     Vital Signs:   Vitals:    05/07/21 1417 05/07/21 1419 05/07/21 1420   BP: 94/59 94/61 91/59   BP Location: Right arm Left arm Left arm   Patient Position: Sitting Sitting Standing   Cuff Size: Adult Adult Adult   Pulse: 66 71 75   Temp:   98.1 °F (36.7 °C)   TempSrc:   Temporal   SpO2: 97% 98% 97%   Weight:   86.8 kg (191 lb 6 oz)   Height:   177.8 cm (70\")     Body mass index is 27.46 kg/m².  Physical Exam  Vitals reviewed.   Constitutional:       General: He is not in acute distress.     Appearance: Normal appearance.   Cardiovascular:      Rate and Rhythm: Normal rate and regular rhythm.      Pulses:           Radial pulses are 2+ on the right side.        Dorsalis pedis pulses are 2+ on the right side.        Posterior tibial pulses are 2+ " on the right side.      Heart sounds: Normal heart sounds.   Pulmonary:      Effort: Pulmonary effort is normal.      Breath sounds: Normal breath sounds.   Abdominal:      Palpations: Abdomen is soft.      Tenderness: There is no abdominal tenderness.   Musculoskeletal:      Right lower leg: No edema.      Left lower leg: No edema.   Skin:     General: Skin is warm and dry.      Findings: Bruising ( Right groin and upper inner thigh, blue with yellow edge discoloration.  No groin hematoma, bruising, tenderness, pain, numbness.) present.   Neurological:      Mental Status: He is alert.   Psychiatric:         Mood and Affect: Mood normal.         Behavior: Behavior is cooperative.              Result Review  Data Reviewed:{ Labs  Result Review  Imaging  Med Tab  Media :23}     Admission on 05/02/2021, Discharged on 05/02/2021   Component Date Value Ref Range Status   • Glucose 05/02/2021 85  65 - 99 mg/dL Final   • BUN 05/02/2021 15  8 - 23 mg/dL Final   • Creatinine 05/02/2021 1.12  0.76 - 1.27 mg/dL Final   • Sodium 05/02/2021 142  136 - 145 mmol/L Final   • Potassium 05/02/2021 4.5  3.5 - 5.2 mmol/L Final   • Chloride 05/02/2021 105  98 - 107 mmol/L Final   • CO2 05/02/2021 27.0  22.0 - 29.0 mmol/L Final   • Calcium 05/02/2021 9.3  8.6 - 10.5 mg/dL Final   • eGFR Non  Amer 05/02/2021 66  >60 mL/min/1.73 Final   • BUN/Creatinine Ratio 05/02/2021 13.4  7.0 - 25.0 Final   • Anion Gap 05/02/2021 10.0  5.0 - 15.0 mmol/L Final   • WBC 05/02/2021 7.80  3.40 - 10.80 10*3/mm3 Final   • RBC 05/02/2021 4.62  4.14 - 5.80 10*6/mm3 Final   • Hemoglobin 05/02/2021 13.9  13.0 - 17.7 g/dL Final   • Hematocrit 05/02/2021 43.2  37.5 - 51.0 % Final   • MCV 05/02/2021 93.5  79.0 - 97.0 fL Final   • MCH 05/02/2021 30.1  26.6 - 33.0 pg Final   • MCHC 05/02/2021 32.2  31.5 - 35.7 g/dL Final   • RDW 05/02/2021 12.6  12.3 - 15.4 % Final   • RDW-SD 05/02/2021 43.5  37.0 - 54.0 fl Final   • MPV 05/02/2021 10.0  6.0 - 12.0 fL Final    • Platelets 05/02/2021 237  140 - 450 10*3/mm3 Final   • Neutrophil % 05/02/2021 68.4  42.7 - 76.0 % Final   • Lymphocyte % 05/02/2021 20.3  19.6 - 45.3 % Final   • Monocyte % 05/02/2021 9.6  5.0 - 12.0 % Final   • Eosinophil % 05/02/2021 1.2  0.3 - 6.2 % Final   • Basophil % 05/02/2021 0.4  0.0 - 1.5 % Final   • Immature Grans % 05/02/2021 0.1  0.0 - 0.5 % Final   • Neutrophils, Absolute 05/02/2021 5.34  1.70 - 7.00 10*3/mm3 Final   • Lymphocytes, Absolute 05/02/2021 1.58  0.70 - 3.10 10*3/mm3 Final   • Monocytes, Absolute 05/02/2021 0.75  0.10 - 0.90 10*3/mm3 Final   • Eosinophils, Absolute 05/02/2021 0.09  0.00 - 0.40 10*3/mm3 Final   • Basophils, Absolute 05/02/2021 0.03  0.00 - 0.20 10*3/mm3 Final   • Immature Grans, Absolute 05/02/2021 0.01  0.00 - 0.05 10*3/mm3 Final   • nRBC 05/02/2021 0.0  0.0 - 0.2 /100 WBC Final   • Extra Tube 05/02/2021 hold for add-on   Final    Auto resulted   • Extra Tube 05/02/2021 Hold for add-ons.   Final    Auto resulted.   • Extra Tube 05/02/2021 Hold for add-ons.   Final    Auto resulted.   • Extra Tube 05/02/2021 Hold for add-ons.   Final    Auto resulted.     Echocardiogram 4/3/2021: EF 55%, mild MR, trace TR with normal RVSP    EKG 4/29/2021: Sinus rhythm 65 bpm    Cardiac Catheterization/Vascular Study (04/29/2021 13:12)    Lab Results   Component Value Date    CHOL 150 04/29/2021     Lab Results   Component Value Date    TRIG 26 04/29/2021     Lab Results   Component Value Date    HDL 35 (L) 04/29/2021     Lab Results   Component Value Date     (H) 04/29/2021         Assessment and Plan {CC Problem List  Visit Diagnosis  ROS  Review (Popup)  Health Maintenance  Quality  BestPractice  Medications  SmartSets  SnapShot Encounters  Media :23}   1. Coronary artery disease involving coronary bypass graft of native heart without angina pectoris  S/p stent X2    Asa, statin brilenta      Discussed postprocedural management, medication management.  Reviewed  activity restrictions.  Discussed cardiac rehab.  Discussed groin bruising and monitoring.  Discussed signs and symptoms of when to call.  2. Hypotension due to drugs  Stop  ACE    Continue to monitor home BP log    Goal <130/80  Reviewed s/s hypotension      3. Dyslipidemia  statin    Follow-up with cardiology and primary care provider routinely.  Follow-up in the heart valve center as needed      Follow Up {Instructions Charge Capture  Follow-up Communications :23}   No follow-ups on file.    Patient was given instructions and counseling regarding his condition or for health maintenance advice. Please see specific information pulled into the AVS if appropriate.  Patient was instructed to call the Heart and Valve Center with any questions, concerns, or worsening symptoms.    *Please note that portions of this note were completed with a voice recognition program. Efforts were made to edit the dictations, but occasionally words are mistranscribed.

## 2021-05-10 LAB
QT INTERVAL: 402 MS
QT INTERVAL: 410 MS
QTC INTERVAL: 426 MS
QTC INTERVAL: 427 MS

## 2021-05-17 ENCOUNTER — DOCUMENTATION (OUTPATIENT)
Dept: CARDIAC REHAB | Facility: HOSPITAL | Age: 61
End: 2021-05-17

## 2021-05-17 NOTE — PROGRESS NOTES
Pt. Referred for Phase II Cardiac Rehab. Staff discussed benefits of exercise, program protocol, and educational material provided. Teach back verified.  Patient was originally scheduled at [facility] on 6/15/21. He wanted to start program sooner. Staff contacted  [facility] and they have an earlier orientation for Mr. Guadalupe.  [facility] staff will call patient to schedule orientation.

## 2021-05-26 ENCOUNTER — TREATMENT (OUTPATIENT)
Dept: CARDIAC REHAB | Facility: HOSPITAL | Age: 61
End: 2021-05-26

## 2021-05-26 DIAGNOSIS — I21.3 ST ELEVATION MYOCARDIAL INFARCTION (STEMI), UNSPECIFIED ARTERY (HCC): ICD-10-CM

## 2021-05-26 PROCEDURE — 93798 PHYS/QHP OP CAR RHAB W/ECG: CPT

## 2021-05-26 NOTE — PROGRESS NOTES
Cardiac Rehab Initial Assessment      Name: Jay Guadalupe  :1960 Allergies:Patient has no known allergies.   MRN: [id number] 61 y.o. Physician: Ronny Avilez MD   Primary Diagnosis:    Diagnosis Plan   1. ST elevation myocardial infarction (STEMI), unspecified artery (CMS/HCC)  Cardiac Rehab Phase II    Event Date: 21 Specialist: Dr. Kerns   Secondary Diagnosis: stent Risk Stratification:High Risk Note Author: Remedios Mota RN     Cardiovascular History: Comments none reported     EXERCISE AT HOME  yes  60min  3 days per week    EF: 55%      Source: Echo 21          Ambulatory Status:Independent  Ambulatory Fall Risk Assessed on Initial Visit: yes 6 Minute Walk Pre- Cardiac Rehab:  Distance:1920ft      RPE:10  Max. HR: 90       SPO2:98%    MET: 3.4  MPH:              RPD:   Resting BP: 112/68 LA, 110/66 RA    Peak BP: 122/72  Recovery BP: 118/72  Comments:       NUTRITION  Lipids:yes If yes, labs as follows;  Total: No components found for: CHOLESTEROL  HDL:   HDL Cholesterol   Date Value Ref Range Status   2021 35 (L) 40 - 60 mg/dL Final    Lipids continued:  LDL:  LDL Cholesterol    Date Value Ref Range Status   2021 108 (H) 0 - 100 mg/dL Final     Triglyceride: No components found for: TRIGLYCERIDE   Weight Management:                 Weight: 189.6  Height: 70 inches                                   BMI: There is no height or weight on file to calculate BMI.  Waist Circumference:   inches   Alcohol Use: none Diabetes:No    Last HGBA1C with date if applicable:No components found for: A1C         SOCIAL HISTORY  Social History     Socioeconomic History   • Marital status:      Spouse name: Not on file   • Number of children: Not on file   • Years of education: Not on file   • Highest education level: Not on file   Tobacco Use   • Smoking status: Never Smoker   • Smokeless tobacco: Never Used   Substance and Sexual Activity   • Alcohol use: No   • Drug use: No   • Sexual  activity: Defer       Educational Level (choose one that applies) some college Learning Barriers:Vision    Family Support:yes    Living Arrangement: lives with their spouse    Risk Factors: Heredity  Yes If Yes Father with AVR     Tobacco Adjunct: N/A  Patient never used tobacco      Comorbidities: none reported     PSYCHOSOCIAL  Clinical Depression: no    Stress: no     Assess presence or absence of depression using a valid screening tool: yes      PHYSICAL ASSESSMENT  Influenza vaccine: no  Pneumococcal vaccine: no          Angina: no    Describe angina scale of 0 - 4: 0 = none    Today are you having incisional pain? No. If, Yes, Scale: none reported        Today are you having any other pain? No. If, Yes, Scale: none reported     Diagnosed with Hypertension:no    Heart Sounds: S1S2 regular     Lung Sounds: normal air entry, lungs clear to auscultation         Assessment:  Orthopedic Problems: Pt. has a possible left meniscus tear.. He is following up with an ortho MD.    Are you being hurt, hit, or frightened by anyone at home or in your life? no    Are you being neglected by a caregiver? N/A Shoulder flexibility/Range of motion: Excellent     Recommended arm activity: Any    Chair sit and reach within: 0 inches   Leg flexibility: Excellent    Leg Strength/Balance/Five times sit to stand: 0 seconds.     Chose one: Average    Recommended stretching: Standing    Assessment:     Family attends IA: no Time of arrival: 1300  Time of departure: 1430     Patient Goals: 1. Patient wants to meet with dietitian for education on lowering LDL with diet by end of program.                            2. Return to activities with his grandchildren with confidence by end of program.         5/26/2021  15:18 EDT  Remedios Mota RN

## 2021-05-28 ENCOUNTER — TREATMENT (OUTPATIENT)
Dept: CARDIAC REHAB | Facility: HOSPITAL | Age: 61
End: 2021-05-28

## 2021-05-28 DIAGNOSIS — I21.3 ST ELEVATION MYOCARDIAL INFARCTION (STEMI), UNSPECIFIED ARTERY (HCC): Primary | ICD-10-CM

## 2021-05-28 PROCEDURE — 93798 PHYS/QHP OP CAR RHAB W/ECG: CPT

## 2021-06-02 ENCOUNTER — TREATMENT (OUTPATIENT)
Dept: CARDIAC REHAB | Facility: HOSPITAL | Age: 61
End: 2021-06-02

## 2021-06-02 DIAGNOSIS — I21.3 ST ELEVATION MYOCARDIAL INFARCTION (STEMI), UNSPECIFIED ARTERY (HCC): Primary | ICD-10-CM

## 2021-06-02 PROCEDURE — 93798 PHYS/QHP OP CAR RHAB W/ECG: CPT

## 2021-06-04 ENCOUNTER — TREATMENT (OUTPATIENT)
Dept: CARDIAC REHAB | Facility: HOSPITAL | Age: 61
End: 2021-06-04

## 2021-06-04 DIAGNOSIS — I21.3 ST ELEVATION MYOCARDIAL INFARCTION (STEMI), UNSPECIFIED ARTERY (HCC): Primary | ICD-10-CM

## 2021-06-04 PROCEDURE — 93798 PHYS/QHP OP CAR RHAB W/ECG: CPT

## 2021-06-07 ENCOUNTER — TREATMENT (OUTPATIENT)
Dept: CARDIAC REHAB | Facility: HOSPITAL | Age: 61
End: 2021-06-07

## 2021-06-07 DIAGNOSIS — I21.3 ST ELEVATION MYOCARDIAL INFARCTION (STEMI), UNSPECIFIED ARTERY (HCC): Primary | ICD-10-CM

## 2021-06-07 PROCEDURE — 93798 PHYS/QHP OP CAR RHAB W/ECG: CPT

## 2021-06-09 ENCOUNTER — TELEPHONE (OUTPATIENT)
Dept: CARDIAC REHAB | Facility: HOSPITAL | Age: 61
End: 2021-06-09

## 2021-06-09 NOTE — TELEPHONE ENCOUNTER
Pt. Canceled cardiac rehab for today due to being out of town. Pt plans on returning his next scheduled session 06/011/21.

## 2021-06-10 ENCOUNTER — TELEPHONE (OUTPATIENT)
Dept: FAMILY MEDICINE CLINIC | Facility: CLINIC | Age: 61
End: 2021-06-10

## 2021-06-11 ENCOUNTER — TREATMENT (OUTPATIENT)
Dept: CARDIAC REHAB | Facility: HOSPITAL | Age: 61
End: 2021-06-11

## 2021-06-11 DIAGNOSIS — I21.3 ST ELEVATION MYOCARDIAL INFARCTION (STEMI), UNSPECIFIED ARTERY (HCC): Primary | ICD-10-CM

## 2021-06-11 PROCEDURE — 93798 PHYS/QHP OP CAR RHAB W/ECG: CPT

## 2021-06-14 ENCOUNTER — TREATMENT (OUTPATIENT)
Dept: CARDIAC REHAB | Facility: HOSPITAL | Age: 61
End: 2021-06-14

## 2021-06-14 DIAGNOSIS — I21.3 ST ELEVATION MYOCARDIAL INFARCTION (STEMI), UNSPECIFIED ARTERY (HCC): Primary | ICD-10-CM

## 2021-06-14 PROCEDURE — 93798 PHYS/QHP OP CAR RHAB W/ECG: CPT

## 2021-06-15 ENCOUNTER — APPOINTMENT (OUTPATIENT)
Dept: CARDIAC REHAB | Facility: HOSPITAL | Age: 61
End: 2021-06-15

## 2021-06-16 ENCOUNTER — TREATMENT (OUTPATIENT)
Dept: CARDIAC REHAB | Facility: HOSPITAL | Age: 61
End: 2021-06-16

## 2021-06-16 DIAGNOSIS — I21.3 ST ELEVATION MYOCARDIAL INFARCTION (STEMI), UNSPECIFIED ARTERY (HCC): Primary | ICD-10-CM

## 2021-06-16 PROCEDURE — 93798 PHYS/QHP OP CAR RHAB W/ECG: CPT

## 2021-06-16 NOTE — PROGRESS NOTES
Adult Outpatient Nutrition  Cardiac rehab    Patient Name:  Jay Guadalupe  YOB: 1960  MRN: [id number]    Assessment Date:  6/16/2021  3:35pm    RD spoke with pt on phone in an effort to schedule nutrition education session. Pt wishes to meet with RD on 6/30/21 @ 12:00pm; RD scheduled pt accordingly.        Electronically signed by:  Jessica Claros MS,IGOR,LD  06/16/21 15:38 EDT

## 2021-06-18 ENCOUNTER — TREATMENT (OUTPATIENT)
Dept: CARDIAC REHAB | Facility: HOSPITAL | Age: 61
End: 2021-06-18

## 2021-06-18 DIAGNOSIS — I21.3 ST ELEVATION MYOCARDIAL INFARCTION (STEMI), UNSPECIFIED ARTERY (HCC): Primary | ICD-10-CM

## 2021-06-18 PROCEDURE — 93798 PHYS/QHP OP CAR RHAB W/ECG: CPT

## 2021-06-21 ENCOUNTER — TREATMENT (OUTPATIENT)
Dept: CARDIAC REHAB | Facility: HOSPITAL | Age: 61
End: 2021-06-21

## 2021-06-21 DIAGNOSIS — I21.3 ST ELEVATION MYOCARDIAL INFARCTION (STEMI), UNSPECIFIED ARTERY (HCC): Primary | ICD-10-CM

## 2021-06-21 PROCEDURE — 93798 PHYS/QHP OP CAR RHAB W/ECG: CPT

## 2021-06-28 ENCOUNTER — TREATMENT (OUTPATIENT)
Dept: CARDIAC REHAB | Facility: HOSPITAL | Age: 61
End: 2021-06-28

## 2021-06-28 DIAGNOSIS — I21.3 ST ELEVATION MYOCARDIAL INFARCTION (STEMI), UNSPECIFIED ARTERY (HCC): Primary | ICD-10-CM

## 2021-06-28 PROCEDURE — 93798 PHYS/QHP OP CAR RHAB W/ECG: CPT

## 2021-06-30 ENCOUNTER — TREATMENT (OUTPATIENT)
Dept: CARDIAC REHAB | Facility: HOSPITAL | Age: 61
End: 2021-06-30

## 2021-06-30 DIAGNOSIS — I21.3 ST ELEVATION MYOCARDIAL INFARCTION (STEMI), UNSPECIFIED ARTERY (HCC): Primary | ICD-10-CM

## 2021-06-30 PROCEDURE — 93798 PHYS/QHP OP CAR RHAB W/ECG: CPT

## 2021-06-30 NOTE — PROGRESS NOTES
Attended Phase II Cardiac Rehab. No medication or health history changes reported. See [facility] for details.

## 2021-06-30 NOTE — PROGRESS NOTES
"CARDIAC/PULMONARY REHAB NUTRITION EDUCATION/ASSESSMENT      61 y.o.         Height: 70 in    Weight: 187 lb per pt     BMI:26.8 IBW:  166      %IBW: 113          Time seen: 12:00-1:00 PM   Dx:  CAD and s/p cath with stent, STEMI.   Cardiac Risk Factors: HLP Weight Assessment: Overweight  Weight Change: pt reports 20lb intentional wt loss over the past 2 years. Pt has been following lowfat diet and exercising during this time.  Wife has had need to follow lowfat diet for her own health reasons for past 1.5 years and they report following together.         Appetite: good     Taste/smell changes:  Pt states he has not had sense of smell for many years.  Pt also states that he has some hearing loss as well but nothing noticeable to RD during edu session today.  Food records reviewed? Yes          Who does the patient live with: wife  Who does the cooking at home:  wife  Spouse/significant other's name:  Deyanira  Spouse/significant other present for diet instruction today? yes  Patient actively receiving lifestyle support from others at home? Yes       Pertinent Lab Values: 4/29/20  Total cholesterol: 150mg/dl  HDL:   HDL Cholesterol   Date Value Ref Range Status   04/29/2021 35 (L) 40 - 60 mg/dL Final     LDL:  LDL Cholesterol    Date Value Ref Range Status   04/29/2021 108 (H) 0 - 100 mg/dL Final     Triglyceride: 26mg/dl  Last HGBA1C with date if applicable: 5%  Glucose:   Glucose   Date Value Ref Range Status   05/02/2021 85 65 - 99 mg/dL Final        Pertinent Nutrition-Related Medications:  Vitamin A supplements d/t RP (per pt), and omega-3 supplements (pt states MD aware he is taking these)              Assessment / Recommendations: Rec that pt follow 4863-6402 calories and 50-74g total fat with no more than 11grams saturated fat/d plan.  Pt states he \"loves salt\" but states he is willing to decrease Na intake. Pt states he had a \"wellness check\" in 2019, and saw some concerning things and this was the impetus for " recent lifestyle change and wt loss endeavors.  Wife states she is an OT, and is very involved with pt and encouraging compliance.  Rec that pt continue to work toward wt loss and towards decreasing salt intake.  Pt states he and wife have currently been evaluated at [facility] for various dx (pt evaluated for CAD).  Pt states he does all the grocery shopping for he/wife; RD reviewed label reading for heart health, and discussed heart healthy eating at restaurants. Pt/wife with many approp questions/ eager to learn,  pt with positive affect toward change, and RD expects compliance with diet guidelines taught in session today.    Motivation level toward diet compliance: strong       Education:      Previous cardiac diet education prior to coming to Cardiac Rehab?  No  Instructed on:  Cardiac/mediterranean diet  Weight management  Lipid management  8212-4043 mg/day Na restriction  Label reading for heart health  Written materials given:  yes         Plan: RD avail for f/u prn.                 13:22 EDT  6/30/2021  Jessica Claros, MS,RD,LD

## 2021-07-02 ENCOUNTER — TREATMENT (OUTPATIENT)
Dept: CARDIAC REHAB | Facility: HOSPITAL | Age: 61
End: 2021-07-02

## 2021-07-02 DIAGNOSIS — I21.3 ST ELEVATION MYOCARDIAL INFARCTION (STEMI), UNSPECIFIED ARTERY (HCC): Primary | ICD-10-CM

## 2021-07-02 PROCEDURE — 93798 PHYS/QHP OP CAR RHAB W/ECG: CPT

## 2021-07-08 ENCOUNTER — OFFICE VISIT (OUTPATIENT)
Dept: FAMILY MEDICINE CLINIC | Facility: CLINIC | Age: 61
End: 2021-07-08

## 2021-07-08 VITALS
OXYGEN SATURATION: 99 % | BODY MASS INDEX: 27.35 KG/M2 | HEIGHT: 70 IN | HEART RATE: 61 BPM | SYSTOLIC BLOOD PRESSURE: 108 MMHG | TEMPERATURE: 98.4 F | WEIGHT: 191 LBS | DIASTOLIC BLOOD PRESSURE: 62 MMHG

## 2021-07-08 DIAGNOSIS — E78.5 DYSLIPIDEMIA: Primary | ICD-10-CM

## 2021-07-08 DIAGNOSIS — Z12.5 PROSTATE CANCER SCREENING: ICD-10-CM

## 2021-07-08 LAB
ALBUMIN SERPL-MCNC: 4.5 G/DL (ref 3.5–5.2)
ALBUMIN/GLOB SERPL: 1.8 G/DL
ALP SERPL-CCNC: 61 U/L (ref 39–117)
ALT SERPL W P-5'-P-CCNC: 26 U/L (ref 1–41)
ANION GAP SERPL CALCULATED.3IONS-SCNC: 9.2 MMOL/L (ref 5–15)
AST SERPL-CCNC: 22 U/L (ref 1–40)
BILIRUB SERPL-MCNC: 0.5 MG/DL (ref 0–1.2)
BUN SERPL-MCNC: 15 MG/DL (ref 8–23)
BUN/CREAT SERPL: 14.3 (ref 7–25)
CALCIUM SPEC-SCNC: 9 MG/DL (ref 8.6–10.5)
CHLORIDE SERPL-SCNC: 106 MMOL/L (ref 98–107)
CHOLEST SERPL-MCNC: 112 MG/DL (ref 0–200)
CO2 SERPL-SCNC: 24.8 MMOL/L (ref 22–29)
CREAT SERPL-MCNC: 1.05 MG/DL (ref 0.76–1.27)
GFR SERPL CREATININE-BSD FRML MDRD: 71 ML/MIN/1.73
GLOBULIN UR ELPH-MCNC: 2.5 GM/DL
GLUCOSE SERPL-MCNC: 90 MG/DL (ref 65–99)
HDLC SERPL-MCNC: 37 MG/DL (ref 40–60)
LDLC SERPL CALC-MCNC: 60 MG/DL (ref 0–100)
LDLC/HDLC SERPL: 1.65 {RATIO}
POTASSIUM SERPL-SCNC: 4.4 MMOL/L (ref 3.5–5.2)
PROT SERPL-MCNC: 7 G/DL (ref 6–8.5)
SODIUM SERPL-SCNC: 140 MMOL/L (ref 136–145)
TRIGL SERPL-MCNC: 69 MG/DL (ref 0–150)
VLDLC SERPL-MCNC: 15 MG/DL (ref 5–40)

## 2021-07-08 PROCEDURE — G0103 PSA SCREENING: HCPCS | Performed by: FAMILY MEDICINE

## 2021-07-08 PROCEDURE — 80061 LIPID PANEL: CPT | Performed by: FAMILY MEDICINE

## 2021-07-08 PROCEDURE — 80053 COMPREHEN METABOLIC PANEL: CPT | Performed by: FAMILY MEDICINE

## 2021-07-08 PROCEDURE — 99213 OFFICE O/P EST LOW 20 MIN: CPT | Performed by: FAMILY MEDICINE

## 2021-07-08 NOTE — PROGRESS NOTES
"Chief Complaint  Hyperlipidemia (fasting labs)    Subjective          Jay Guadalupe presents to [facility]  The patient is here for follow-up of CAD and hyperlipidemia.       Hyperlipidemia  This is a new problem. The current episode started more than 1 month ago. The problem is controlled. Pertinent negatives include no chest pain or myalgias. Current antihyperlipidemic treatment includes statins and diet change. The current treatment provides significant improvement of lipids. There are no compliance problems.  Risk factors: Known CAD with recent stent placement he is currently in cardiac rehab and doing very well.   CAD  He states he feels great and has had no chest pain or heart palpitations. He has an appointment to see his cardiologist in 08/2021. He is not currently on any medication for hypertension. The patient has been attending rehabilitation and has completed 12 to 13 visits so far. He has been exercising on bicycles, treadmills, ellipticals, and rowing machines in therapy.     Knee Meniscus Tear  He reports having a torn meniscus of the left knee and the need for surgery to repair it. He will be having the knee evaluated soon. He states the knee is not limiting his rehabilitation    Cyst on Back  He has an appointment with dermatology to evaluate a cyst on his back as well as do an overall skin examination.     Medications  Medication list was reviewed in detail with the patient and updated in the chart.       Objective   Vital Signs:   /62   Pulse 61   Temp 98.4 °F (36.9 °C)   Ht 177.8 cm (70\")   Wt 86.6 kg (191 lb)   SpO2 99%   BMI 27.41 kg/m²     Physical Exam  Vitals and nursing note reviewed.   Constitutional:       Appearance: Normal appearance. He is well-developed.   HENT:      Head: Normocephalic and atraumatic.      Right Ear: External ear normal.      Left Ear: External ear normal.      Nose: Nose normal.   Eyes:      General: No scleral " icterus.     Conjunctiva/sclera: Conjunctivae normal.      Pupils: Pupils are equal, round, and reactive to light.   Neck:      Thyroid: No thyromegaly.   Cardiovascular:      Rate and Rhythm: Normal rate and regular rhythm.      Heart sounds: Normal heart sounds.   Pulmonary:      Effort: Pulmonary effort is normal.      Breath sounds: Normal breath sounds.   Musculoskeletal:         General: Normal range of motion.      Cervical back: Neck supple.      Right lower leg: No edema.      Left lower leg: No edema.   Skin:     General: Skin is warm and dry.      Findings: No rash.   Neurological:      General: No focal deficit present.      Mental Status: He is alert and oriented to person, place, and time.      Comments: No focal deficits no lateralizing signs   Psychiatric:         Mood and Affect: Mood normal.         Behavior: Behavior is cooperative.        Result Review :                 Assessment and Plan    Diagnoses and all orders for this visit:    1. Dyslipidemia (Primary)  -     Comprehensive Metabolic Panel  -     Lipid Panel    2. Prostate cancer screening  -     PSA Screen        Follow Up   Return in about 6 months (around 1/8/2022) for Recheck, fasting appt.  Patient was given instructions and counseling regarding his condition or for health maintenance advice. Please see specific information pulled into the AVS if appropriate.     Scribed for Ronny Avilez MD by Kelly Alcantar.  07/08/21   12:59 EDT    I have personally performed the services described in this document as scribed by the above individual, and it is both accurate and complete.  Ronny Avilez MD  7/8/2021  13:34 EDT

## 2021-07-09 ENCOUNTER — TREATMENT (OUTPATIENT)
Dept: CARDIAC REHAB | Facility: HOSPITAL | Age: 61
End: 2021-07-09

## 2021-07-09 DIAGNOSIS — I21.3 ST ELEVATION MYOCARDIAL INFARCTION (STEMI), UNSPECIFIED ARTERY (HCC): Primary | ICD-10-CM

## 2021-07-09 LAB — PSA SERPL-MCNC: 8.98 NG/ML (ref 0–4)

## 2021-07-09 PROCEDURE — 93798 PHYS/QHP OP CAR RHAB W/ECG: CPT

## 2021-07-12 ENCOUNTER — TREATMENT (OUTPATIENT)
Dept: CARDIAC REHAB | Facility: HOSPITAL | Age: 61
End: 2021-07-12

## 2021-07-12 DIAGNOSIS — I21.3 ST ELEVATION MYOCARDIAL INFARCTION (STEMI), UNSPECIFIED ARTERY (HCC): Primary | ICD-10-CM

## 2021-07-12 PROCEDURE — 93798 PHYS/QHP OP CAR RHAB W/ECG: CPT

## 2021-07-14 ENCOUNTER — TREATMENT (OUTPATIENT)
Dept: CARDIAC REHAB | Facility: HOSPITAL | Age: 61
End: 2021-07-14

## 2021-07-14 DIAGNOSIS — I21.3 ST ELEVATION MYOCARDIAL INFARCTION (STEMI), UNSPECIFIED ARTERY (HCC): Primary | ICD-10-CM

## 2021-07-14 PROCEDURE — 93798 PHYS/QHP OP CAR RHAB W/ECG: CPT

## 2021-07-16 ENCOUNTER — TREATMENT (OUTPATIENT)
Dept: CARDIAC REHAB | Facility: HOSPITAL | Age: 61
End: 2021-07-16

## 2021-07-16 DIAGNOSIS — I21.3 ST ELEVATION MYOCARDIAL INFARCTION (STEMI), UNSPECIFIED ARTERY (HCC): Primary | ICD-10-CM

## 2021-07-16 PROCEDURE — 93798 PHYS/QHP OP CAR RHAB W/ECG: CPT

## 2021-07-19 ENCOUNTER — TREATMENT (OUTPATIENT)
Dept: CARDIAC REHAB | Facility: HOSPITAL | Age: 61
End: 2021-07-19

## 2021-07-19 DIAGNOSIS — I21.3 ST ELEVATION MYOCARDIAL INFARCTION (STEMI), UNSPECIFIED ARTERY (HCC): Primary | ICD-10-CM

## 2021-07-19 PROCEDURE — 93798 PHYS/QHP OP CAR RHAB W/ECG: CPT

## 2021-07-26 ENCOUNTER — TREATMENT (OUTPATIENT)
Dept: CARDIAC REHAB | Facility: HOSPITAL | Age: 61
End: 2021-07-26

## 2021-07-26 DIAGNOSIS — I21.3 ST ELEVATION MYOCARDIAL INFARCTION (STEMI), UNSPECIFIED ARTERY (HCC): Primary | ICD-10-CM

## 2021-07-26 PROCEDURE — 93798 PHYS/QHP OP CAR RHAB W/ECG: CPT

## 2021-07-28 ENCOUNTER — TREATMENT (OUTPATIENT)
Dept: CARDIAC REHAB | Facility: HOSPITAL | Age: 61
End: 2021-07-28

## 2021-07-28 DIAGNOSIS — I21.3 ST ELEVATION MYOCARDIAL INFARCTION (STEMI), UNSPECIFIED ARTERY (HCC): Primary | ICD-10-CM

## 2021-07-28 PROCEDURE — 93798 PHYS/QHP OP CAR RHAB W/ECG: CPT

## 2021-07-30 ENCOUNTER — TREATMENT (OUTPATIENT)
Dept: CARDIAC REHAB | Facility: HOSPITAL | Age: 61
End: 2021-07-30

## 2021-07-30 DIAGNOSIS — I21.3 ST ELEVATION MYOCARDIAL INFARCTION (STEMI), UNSPECIFIED ARTERY (HCC): Primary | ICD-10-CM

## 2021-07-30 PROCEDURE — 93798 PHYS/QHP OP CAR RHAB W/ECG: CPT

## 2021-08-02 ENCOUNTER — TREATMENT (OUTPATIENT)
Dept: CARDIAC REHAB | Facility: HOSPITAL | Age: 61
End: 2021-08-02

## 2021-08-02 DIAGNOSIS — I21.3 ST ELEVATION MYOCARDIAL INFARCTION (STEMI), UNSPECIFIED ARTERY (HCC): Primary | ICD-10-CM

## 2021-08-02 PROCEDURE — 93798 PHYS/QHP OP CAR RHAB W/ECG: CPT

## 2021-08-06 ENCOUNTER — TREATMENT (OUTPATIENT)
Dept: CARDIAC REHAB | Facility: HOSPITAL | Age: 61
End: 2021-08-06

## 2021-08-06 ENCOUNTER — OFFICE VISIT (OUTPATIENT)
Dept: CARDIOLOGY | Facility: CLINIC | Age: 61
End: 2021-08-06

## 2021-08-06 VITALS
BODY MASS INDEX: 27.35 KG/M2 | HEIGHT: 70 IN | WEIGHT: 191 LBS | OXYGEN SATURATION: 98 % | SYSTOLIC BLOOD PRESSURE: 98 MMHG | HEART RATE: 78 BPM | DIASTOLIC BLOOD PRESSURE: 68 MMHG

## 2021-08-06 DIAGNOSIS — I25.810 CORONARY ARTERY DISEASE INVOLVING CORONARY BYPASS GRAFT OF NATIVE HEART WITHOUT ANGINA PECTORIS: Primary | ICD-10-CM

## 2021-08-06 DIAGNOSIS — I21.3 ST ELEVATION MYOCARDIAL INFARCTION (STEMI), UNSPECIFIED ARTERY (HCC): Primary | ICD-10-CM

## 2021-08-06 DIAGNOSIS — I21.21 ST ELEVATION MYOCARDIAL INFARCTION INVOLVING LEFT CIRCUMFLEX CORONARY ARTERY (HCC): ICD-10-CM

## 2021-08-06 DIAGNOSIS — E78.2 MIXED HYPERLIPIDEMIA: ICD-10-CM

## 2021-08-06 PROCEDURE — 93798 PHYS/QHP OP CAR RHAB W/ECG: CPT

## 2021-08-06 PROCEDURE — 99213 OFFICE O/P EST LOW 20 MIN: CPT | Performed by: INTERNAL MEDICINE

## 2021-08-06 RX ORDER — ROSUVASTATIN CALCIUM 20 MG/1
20 TABLET, COATED ORAL NIGHTLY
Qty: 90 TABLET | Refills: 3 | Status: SHIPPED | OUTPATIENT
Start: 2021-08-06 | End: 2021-08-09 | Stop reason: SDUPTHER

## 2021-08-09 ENCOUNTER — TREATMENT (OUTPATIENT)
Dept: CARDIAC REHAB | Facility: HOSPITAL | Age: 61
End: 2021-08-09

## 2021-08-09 DIAGNOSIS — I21.3 ST ELEVATION MYOCARDIAL INFARCTION (STEMI), UNSPECIFIED ARTERY (HCC): Primary | ICD-10-CM

## 2021-08-09 PROCEDURE — 93798 PHYS/QHP OP CAR RHAB W/ECG: CPT

## 2021-08-09 RX ORDER — ROSUVASTATIN CALCIUM 20 MG/1
20 TABLET, COATED ORAL NIGHTLY
Qty: 90 TABLET | Refills: 3 | Status: SHIPPED | OUTPATIENT
Start: 2021-08-09 | End: 2021-10-11 | Stop reason: SDUPTHER

## 2021-08-11 ENCOUNTER — TREATMENT (OUTPATIENT)
Dept: CARDIAC REHAB | Facility: HOSPITAL | Age: 61
End: 2021-08-11

## 2021-08-11 DIAGNOSIS — I21.3 ST ELEVATION MYOCARDIAL INFARCTION (STEMI), UNSPECIFIED ARTERY (HCC): Primary | ICD-10-CM

## 2021-08-11 PROCEDURE — 93798 PHYS/QHP OP CAR RHAB W/ECG: CPT

## 2021-08-13 ENCOUNTER — TREATMENT (OUTPATIENT)
Dept: CARDIAC REHAB | Facility: HOSPITAL | Age: 61
End: 2021-08-13

## 2021-08-13 DIAGNOSIS — I21.3 ST ELEVATION MYOCARDIAL INFARCTION (STEMI), UNSPECIFIED ARTERY (HCC): Primary | ICD-10-CM

## 2021-08-13 PROCEDURE — 93798 PHYS/QHP OP CAR RHAB W/ECG: CPT

## 2021-08-16 ENCOUNTER — TREATMENT (OUTPATIENT)
Dept: CARDIAC REHAB | Facility: HOSPITAL | Age: 61
End: 2021-08-16

## 2021-08-16 DIAGNOSIS — I21.3 ST ELEVATION MYOCARDIAL INFARCTION (STEMI), UNSPECIFIED ARTERY (HCC): Primary | ICD-10-CM

## 2021-08-16 PROCEDURE — 93798 PHYS/QHP OP CAR RHAB W/ECG: CPT

## 2021-08-18 ENCOUNTER — TREATMENT (OUTPATIENT)
Dept: CARDIAC REHAB | Facility: HOSPITAL | Age: 61
End: 2021-08-18

## 2021-08-18 DIAGNOSIS — I21.3 ST ELEVATION MYOCARDIAL INFARCTION (STEMI), UNSPECIFIED ARTERY (HCC): Primary | ICD-10-CM

## 2021-08-18 PROCEDURE — 93798 PHYS/QHP OP CAR RHAB W/ECG: CPT

## 2021-08-20 ENCOUNTER — TREATMENT (OUTPATIENT)
Dept: CARDIAC REHAB | Facility: HOSPITAL | Age: 61
End: 2021-08-20

## 2021-08-20 DIAGNOSIS — I21.3 ST ELEVATION MYOCARDIAL INFARCTION (STEMI), UNSPECIFIED ARTERY (HCC): Primary | ICD-10-CM

## 2021-08-20 PROCEDURE — 93798 PHYS/QHP OP CAR RHAB W/ECG: CPT

## 2021-08-23 ENCOUNTER — TREATMENT (OUTPATIENT)
Dept: CARDIAC REHAB | Facility: HOSPITAL | Age: 61
End: 2021-08-23

## 2021-08-23 DIAGNOSIS — I21.3 ST ELEVATION MYOCARDIAL INFARCTION (STEMI), UNSPECIFIED ARTERY (HCC): Primary | ICD-10-CM

## 2021-08-23 PROCEDURE — 93798 PHYS/QHP OP CAR RHAB W/ECG: CPT

## 2021-08-25 ENCOUNTER — TREATMENT (OUTPATIENT)
Dept: CARDIAC REHAB | Facility: HOSPITAL | Age: 61
End: 2021-08-25

## 2021-08-25 DIAGNOSIS — I21.3 ST ELEVATION MYOCARDIAL INFARCTION (STEMI), UNSPECIFIED ARTERY (HCC): Primary | ICD-10-CM

## 2021-08-25 PROCEDURE — 93798 PHYS/QHP OP CAR RHAB W/ECG: CPT

## 2021-08-26 NOTE — PATIENT INSTRUCTIONS
The American Heart Association recommends 150 minutes of aerobic exercise per week above and beyond your activities of daily living.  Your target heart rate is .  Your maximum heart rate for exercise is 157 and should not be sustained for long periods of time.  These numbers are based on age and should be evaluated yearly.        Exercise Guidelines During Cardiac Rehabilitation  When you are recovering from a heart condition or surgery, it is important to have heart-healthy habits, including exercise routines. Discuss an appropriate exercise program with your heart specialist (cardiologist) and rehabilitation therapist.  The program should meet your specific abilities and needs. Walking, biking, jogging, and swimming are all good aerobic activities and take light to moderate effort. Aerobic activities cause your heart to beat faster. Adding some light resistance training is also good for you. Even simple lifestyle changes can help. These lifestyle changes may include parking farther from the store or taking the stairs instead of the elevator.  At first, you may begin exercising under supervision, such as at a hospital or clinic. Over time, you may begin exercising at home if your health care provider approves.  Types of exercise  Below are types of exercises that are an important part of cardiac rehabilitation. Follow your health care provider's instructions on what types of exercises are good for you and your heart.  Aerobic exercise  Aerobic exercise keeps joints and muscles moving and is important to keep your heart healthy. It involves large muscle groups and improves blood flow (circulation) and endurance. It is also rhythmic and must be done for a longer period of time. Examples of aerobic exercise include:  · Swimming.  · Walking.  · Hiking.  · Jogging.  · Cross-country skiing.  · Dancing.  · Biking.    Static exercise  Static exercise (isometric exercise) uses muscles at high intensities without  moving the joints. Some examples of static exercise include pushing against a heavy couch that does not move, doing a wall sit, or holding a plank position. Static exercise improves strength but also quickly increases blood pressure. Follow these guidelines:  · If you have circulation problems or high blood pressure, talk with your health care provider before starting any static exercise routines. Do not do static exercises if your health care provider tells you not to.  · Do not hold your breath while doing static exercises. Holding your breath during static exercises can raise your blood pressure to a dangerously high level.    Weight-resistance exercise  Weight-resistance exercises are another important part of rehabilitation. These exercises strengthen your muscles by making them work against resistance. Resistance exercises may help you return to activities of daily living sooner and improve your quality of life. They also help reduce cardiac risk factors. Examples of weight-resistance exercise include using:  · Free weights.  · Weight-lifting machines.  · Large, specially designed rubber bands.  You will usually do weight-resistance exercises 2 times a week, with a 2-day rest period between workouts.  Stretching  Stretching before you exercise warms up your muscles and prevents injury. Stretching also improves your flexibility, balance, coordination, and range of motion. Follow these guidelines:  · Stretch before and after exercising.  · Do not force a muscle or joint into a painful angle. Stretching should be a relaxing part of your exercise routine.  · When you feel resistance in your muscle, hold the stretch for a few seconds. Make sure you keep breathing while you hold the stretch.  · Go slowly when doing all stretches.  Setting a pace  · Choose a pace that is comfortable for you.  ? You should be able to talk while exercising. If you are short of breath or unable to speak while you exercise, slow  down.  ? If you can sing while exercising, you are not exercising hard enough.  · Keep track of how hard you are working as you exercise (exertion level). Your rehabilitation therapist can teach you to use a mental scale to measure your level of exertion (perceived exertion). Using a mental scale, you will think about your exertion level and rate it in a range from 6 to 20.  ? A rating of 6 to 10. This means that you are doing very light exercise and are not exerting yourself enough. For a healthy person, this may be walking at a slow pace.  ? A rating of 11 to 15. This is exercise that is somewhat hard. For a healthy exercise session, you should aim for an exertion rate that is within this range.  ? A rating of 16 to 18. This is considered very hard or strenuous. For a healthy person, exercise at this rating may start to feel heavy and difficult.  ? A rating of 19 or 20. This means that you are working extremely hard. For most people, these numbers represent the hardest you have ever worked to exercise.  · Your health care provider or cardiac rehabilitation specialist may also recommend that you wear a heart rate monitor while you exercise. This will help keep track of your heart rate zones and how hard your heart is working.  Frequency  As you are recovering, it is important to start exercising slowly and to gradually work up to your goal. Work with your health care provider to set up an exercise routine that works for you. Generally, cardiac rehabilitation exercise should include:  · 40 minutes of aerobic activity 3-4 days a week.  · Stretching and strength exercises 2-3 days a week.  Contact a health care provider if:  · You have any of the following symptoms while exercising:  ? Pain, pressure, or burning in your chest, jaw, shoulder, or back (angina).  ? Feeling light-headed or dizzy.  ? Irregular or fast heartbeats (palpitations).  ? Shortness of breath.  · You are extremely tired after exercising.  Get help  "right away if you:  · Have angina that lasts for longer than 5 minutes and medicine does not help.  · Have severe chest discomfort, especially if the pain is crushing or pressure-like and spreads to your arms, back, neck, or jaw. Do not wait to see if the pain will go away.  · Have weakness or numbness in one or both legs.  · Are confused.  · Have trouble breathing or shortness of breath.  · Have excessive sweating that is not caused by exercise.  · Have any symptoms of a stroke. \"BE FAST\" is an easy way to remember the main warning signs of a stroke:  ? B - Balance. Signs are dizziness, sudden trouble walking, or loss of balance.  ? E - Eyes. Signs are trouble seeing or a sudden change in vision.  ? F - Face. Signs are sudden weakness or numbness of the face, or the face or eyelid drooping on one side.  ? A - Arms. Signs are weakness or numbness in an arm. This happens suddenly and usually on one side of the body.  ? S - Speech. Signs are sudden trouble speaking, slurred speech, or trouble understanding what people say.  ? T - Time. Time to call emergency services. Write down what time symptoms started.  · Have other signs of a stroke, such as:  ? A sudden, severe headache with no known cause.  ? Nausea or vomiting.  ? Seizure.  These symptoms may represent a serious problem that is an emergency. Do not wait to see if the symptoms will go away. Get medical help right away. Call your local emergency services (911 in the [place] Do not drive yourself to the hospital.  Summary  · When you are recovering from a heart condition, it is important to have heart-healthy habits, including exercise routines.  · At first, you may begin exercising under supervision, such as at a hospital or clinic. Over time, you may begin exercising at home if your health care provider approves.  · Choose a pace that is comfortable for you. You should be able to talk while exercising.  · Aim for 40 minutes of aerobic exercises 3-4 days a " "week.  · Aim to do stretching and strength exercises 2-3 days a week.  This information is not intended to replace advice given to you by your health care provider. Make sure you discuss any questions you have with your health care provider.  Document Revised: 09/09/2020 Document Reviewed: 09/09/2020  [employer] Patient Education © 2021 [employer]    https://www.nhlbi.nih.gov/files/docs/public/heart/dash_brief.pdf\">   DASH Eating Plan  DASH stands for Dietary Approaches to Stop Hypertension. The DASH eating plan is a healthy eating plan that has been shown to:  · Reduce high blood pressure (hypertension).  · Reduce your risk for type 2 diabetes, heart disease, and stroke.  · Help with weight loss.  What are tips for following this plan?  Reading food labels  · Check food labels for the amount of salt (sodium) per serving. Choose foods with less than 5 percent of the Daily Value of sodium. Generally, foods with less than 300 milligrams (mg) of sodium per serving fit into this eating plan.  · To find whole grains, look for the word \"whole\" as the first word in the ingredient list.  Shopping  · Buy products labeled as \"low-sodium\" or \"no salt added.\"  · Buy fresh foods. Avoid canned foods and pre-made or frozen meals.  Cooking  · Avoid adding salt when cooking. Use salt-free seasonings or herbs instead of table salt or sea salt. Check with your health care provider or pharmacist before using salt substitutes.  · Do not flores foods. Cook foods using healthy methods such as baking, boiling, grilling, roasting, and broiling instead.  · Cook with heart-healthy oils, such as olive, canola, avocado, soybean, or sunflower oil.  Meal planning    · Eat a balanced diet that includes:  ? 4 or more servings of fruits and 4 or more servings of vegetables each day. Try to fill one-half of your plate with fruits and vegetables.  ? 6-8 servings of whole grains each day.  ? Less than 6 oz (170 g) of lean meat, poultry, or fish each day. " A 3-oz (85-g) serving of meat is about the same size as a deck of cards. One egg equals 1 oz (28 g).  ? 2-3 servings of low-fat dairy each day. One serving is 1 cup (237 mL).  ? 1 serving of nuts, seeds, or beans 5 times each week.  ? 2-3 servings of heart-healthy fats. Healthy fats called omega-3 fatty acids are found in foods such as walnuts, flaxseeds, fortified milks, and eggs. These fats are also found in cold-water fish, such as sardines, salmon, and mackerel.  · Limit how much you eat of:  ? Canned or prepackaged foods.  ? Food that is high in trans fat, such as some fried foods.  ? Food that is high in saturated fat, such as fatty meat.  ? Desserts and other sweets, sugary drinks, and other foods with added sugar.  ? Full-fat dairy products.  · Do not salt foods before eating.  · Do not eat more than 4 egg yolks a week.  · Try to eat at least 2 vegetarian meals a week.  · Eat more home-cooked food and less restaurant, buffet, and fast food.  Lifestyle  · When eating at a restaurant, ask that your food be prepared with less salt or no salt, if possible.  · If you drink alcohol:  ? Limit how much you use to:  § 0-1 drink a day for women who are not pregnant.  § 0-2 drinks a day for men.  ? Be aware of how much alcohol is in your drink. In the [place] one drink equals one 12 oz bottle of beer (355 mL), one 5 oz glass of wine (148 mL), or one 1½ oz glass of hard liquor (44 mL).  General information  · Avoid eating more than 2,300 mg of salt a day. If you have hypertension, you may need to reduce your sodium intake to 1,500 mg a day.  · Work with your health care provider to maintain a healthy body weight or to lose weight. Ask what an ideal weight is for you.  · Get at least 30 minutes of exercise that causes your heart to beat faster (aerobic exercise) most days of the week. Activities may include walking, swimming, or biking.  · Work with your health care provider or dietitian to adjust your eating plan to your  individual calorie needs.  What foods should I eat?  Fruits  All fresh, dried, or frozen fruit. Canned fruit in natural juice (without added sugar).  Vegetables  Fresh or frozen vegetables (raw, steamed, roasted, or grilled). Low-sodium or reduced-sodium tomato and vegetable juice. Low-sodium or reduced-sodium tomato sauce and tomato paste. Low-sodium or reduced-sodium canned vegetables.  Grains  Whole-grain or whole-wheat bread. Whole-grain or whole-wheat pasta. Brown rice. Oatmeal. Quinoa. Bulgur. Whole-grain and low-sodium cereals. Elaine bread. Low-fat, low-sodium crackers. Whole-wheat flour tortillas.  Meats and other proteins  Skinless chicken or turkey. Ground chicken or turkey. Pork with fat trimmed off. Fish and seafood. Egg whites. Dried beans, peas, or lentils. Unsalted nuts, nut butters, and seeds. Unsalted canned beans. Lean cuts of beef with fat trimmed off. Low-sodium, lean precooked or cured meat, such as sausages or meat loaves.  Dairy  Low-fat (1%) or fat-free (skim) milk. Reduced-fat, low-fat, or fat-free cheeses. Nonfat, low-sodium ricotta or cottage cheese. Low-fat or nonfat yogurt. Low-fat, low-sodium cheese.  Fats and oils  Soft margarine without trans fats. Vegetable oil. Reduced-fat, low-fat, or light mayonnaise and salad dressings (reduced-sodium). Canola, safflower, olive, avocado, soybean, and sunflower oils. Avocado.  Seasonings and condiments  Herbs. Spices. Seasoning mixes without salt.  Other foods  Unsalted popcorn and pretzels. Fat-free sweets.  The items listed above may not be a complete list of foods and beverages you can eat. Contact a dietitian for more information.  What foods should I avoid?  Fruits  Canned fruit in a light or heavy syrup. Fried fruit. Fruit in cream or butter sauce.  Vegetables  Creamed or fried vegetables. Vegetables in a cheese sauce. Regular canned vegetables (not low-sodium or reduced-sodium). Regular canned tomato sauce and paste (not low-sodium or  reduced-sodium). Regular tomato and vegetable juice (not low-sodium or reduced-sodium). Pickles. Olives.  Grains  Baked goods made with fat, such as croissants, muffins, or some breads. Dry pasta or rice meal packs.  Meats and other proteins  Fatty cuts of meat. Ribs. Fried meat. Farah. Bologna, salami, and other precooked or cured meats, such as sausages or meat loaves. Fat from the back of a pig (fatback). Bratwurst. Salted nuts and seeds. Canned beans with added salt. Canned or smoked fish. Whole eggs or egg yolks. Chicken or turkey with skin.  Dairy  Whole or 2% milk, cream, and half-and-half. Whole or full-fat cream cheese. Whole-fat or sweetened yogurt. Full-fat cheese. Nondairy creamers. Whipped toppings. Processed cheese and cheese spreads.  Fats and oils  Butter. Stick margarine. Lard. Shortening. Ghee. Farah fat. Tropical oils, such as coconut, palm kernel, or palm oil.  Seasonings and condiments  Onion salt, garlic salt, seasoned salt, table salt, and sea salt. Worcestershire sauce. Tartar sauce. Barbecue sauce. Teriyaki sauce. Soy sauce, including reduced-sodium. Steak sauce. Canned and packaged gravies. Fish sauce. Oyster sauce. Cocktail sauce. Store-bought horseradish. Ketchup. Mustard. Meat flavorings and tenderizers. Bouillon cubes. Hot sauces. Pre-made or packaged marinades. Pre-made or packaged taco seasonings. Relishes. Regular salad dressings.  Other foods  Salted popcorn and pretzels.  The items listed above may not be a complete list of foods and beverages you should avoid. Contact a dietitian for more information.  Where to find more information  · National Heart, Lung, and Blood [place]: www.nhlbi.nih.gov  · American Heart Association: www.heart.org  · Academy of Nutrition and Dietetics: www.eatright.org  · National Kidney Foundation: www.kidney.org  Summary  · The DASH eating plan is a healthy eating plan that has been shown to reduce high blood pressure (hypertension). It may also reduce  your risk for type 2 diabetes, heart disease, and stroke.  · When on the DASH eating plan, aim to eat more fresh fruits and vegetables, whole grains, lean proteins, low-fat dairy, and heart-healthy fats.  · With the DASH eating plan, you should limit salt (sodium) intake to 2,300 mg a day. If you have hypertension, you may need to reduce your sodium intake to 1,500 mg a day.  · Work with your health care provider or dietitian to adjust your eating plan to your individual calorie needs.  This information is not intended to replace advice given to you by your health care provider. Make sure you discuss any questions you have with your health care provider.  Document Revised: 11/20/2020 Document Reviewed: 11/20/2020  [employer] Patient Education © 2021 [employer]

## 2021-08-27 ENCOUNTER — TREATMENT (OUTPATIENT)
Dept: CARDIAC REHAB | Facility: HOSPITAL | Age: 61
End: 2021-08-27

## 2021-08-27 DIAGNOSIS — I21.3 ST ELEVATION MYOCARDIAL INFARCTION (STEMI), UNSPECIFIED ARTERY (HCC): Primary | ICD-10-CM

## 2021-08-27 PROCEDURE — 93798 PHYS/QHP OP CAR RHAB W/ECG: CPT

## 2021-09-01 ENCOUNTER — TREATMENT (OUTPATIENT)
Dept: CARDIAC REHAB | Facility: HOSPITAL | Age: 61
End: 2021-09-01

## 2021-09-01 DIAGNOSIS — I21.3 ST ELEVATION MYOCARDIAL INFARCTION (STEMI), UNSPECIFIED ARTERY (HCC): Primary | ICD-10-CM

## 2021-09-01 PROCEDURE — 93798 PHYS/QHP OP CAR RHAB W/ECG: CPT

## 2021-09-01 NOTE — PROGRESS NOTES
Attended Phase II Cardiac Rehab. No medication or health history changes reported. See [facility] for details.

## 2021-09-03 ENCOUNTER — TREATMENT (OUTPATIENT)
Dept: CARDIAC REHAB | Facility: HOSPITAL | Age: 61
End: 2021-09-03

## 2021-09-03 DIAGNOSIS — I21.3 ST ELEVATION MYOCARDIAL INFARCTION (STEMI), UNSPECIFIED ARTERY (HCC): Primary | ICD-10-CM

## 2021-09-03 PROCEDURE — 93798 PHYS/QHP OP CAR RHAB W/ECG: CPT

## 2021-09-08 ENCOUNTER — TREATMENT (OUTPATIENT)
Dept: CARDIAC REHAB | Facility: HOSPITAL | Age: 61
End: 2021-09-08

## 2021-09-08 DIAGNOSIS — I21.3 ST ELEVATION MYOCARDIAL INFARCTION (STEMI), UNSPECIFIED ARTERY (HCC): Primary | ICD-10-CM

## 2021-09-08 PROCEDURE — 93798 PHYS/QHP OP CAR RHAB W/ECG: CPT

## 2021-09-15 ENCOUNTER — TRANSCRIBE ORDERS (OUTPATIENT)
Dept: CARDIAC REHAB | Facility: HOSPITAL | Age: 61
End: 2021-09-15

## 2021-09-15 DIAGNOSIS — Z00.00 PREVENTATIVE HEALTH CARE: Primary | ICD-10-CM

## 2021-10-04 NOTE — PROGRESS NOTES
Initial Consult    Referring Jovani Hair DO     History Of Present Illness  Mahad is a 39 year old male presenting for a patient consult.  The patient was referred by the physician above. The H&P was sent to this referring physician listed above.  They are interested in pursuing sleeve gastrectomy surgery 57585. The patient has watched the educational video discussing surgical and non surgical options in the treatment of their obesity.  We discussed expectations for weight loss with different interventions and contraindications of the different interventions based on the patients medical history. We also discussed adiposopathy and the long term effects on wellness and end organ damage.  An eating plan, exercise plan and overall treatment plan were discussed with the patient..  60 minutes spent counseling and coordinating care with patient 80 minutes in total.    PAST MEDICAL HISTORY  Patient Active Problem List   Diagnosis   • Right otitis media   • Achilles tendinitis of left lower extremity   • Cigarette nicotine dependence without complication   • Dyslipidemia   • Elevated liver enzymes   • Obesity (BMI 30.0-34.9)   • IFG (impaired fasting glucose)       PAST SURGICAL HISTORY  Past Surgical History:   Procedure Laterality Date   • Eye surgery  2015    Lasik       SOCIAL HISTORY  Social History     Tobacco Use   • Smoking status: Current Every Day Smoker     Packs/day: 0.50     Years: 20.00     Pack years: 10.00   • Smokeless tobacco: Never Used   • Tobacco comment: on and off 20 yrs   Substance Use Topics   • Alcohol use: Yes     Comment: rarely   • Drug use: Never       FAMILY HISTORY  Family History   Problem Relation Age of Onset   • Diabetes Mother    • Hyperlipidemia Mother    • Patient is unaware of any medical problems Father    • Diabetes Maternal Grandmother    • Cancer Maternal Grandmother    • Patient is unaware of any medical problems Brother    • Motor Vehicle Accident Maternal Grandfather    •  [facility]    Encounter Date: 2021    Patient ID: Jay Guadalupe is a 61 y.o. male.  : 1960     PCP: Ronny Avilez MD       Chief Complaint: CAD    PROBLEM LIST:  1. Coronary artery disease  a. STEMI, 2021  b. LHC, 2021: EF 60%. 100% occlusion of infarct-related mid left circumflex coronary artery. Successful thrombectomy followed by PTCA and placement of 3.0 x 23 mm BENITEZ to mid circumflex reducing 100% stenosis to 0%. 90% stenosis of the distal LAD successfully stented with 2.5 x 23 mm BENITEZ postdilated to 3.0 mm and reduced to 0%. Residual mild nonobstructive plaque of the proximal LAD, moderate nonobstructive plaque of the mid RCA and 60 to 70% stenosis of the distal RCA. Mild high lateral hypokinesis.   c. Echocardiogram, 2021: EF 55%. Mild concentric LVH. Mild MR. Trace TR with normal RVSP.   2. Dyslipidemia     History of Present Illness  Patient presents today for 6 week hospital follow-up s/p left heart catheterization with a history of coronary artery disease and cardiac risk factors. Since last visit, patient has done well. He denies any chest pain, shortness of breath. He has been going to cardiac rehab and doing well. He is exercising on his off days which includes a 1.5 mile walk. He had FLP checked last month with acceptable parameters. He needs knee surgery but understands that he should wait until October.     No Known Allergies      Current Outpatient Medications:   •  aspirin 81 MG chewable tablet, Chew 1 tablet Daily., Disp: 100 tablet, Rfl: 3  •  nitroglycerin (NITROSTAT) 0.4 MG SL tablet, Place 1 under the tongue as needed for chest pain, may repeat every 5 minutes for up three doses, Disp: 100 tablet, Rfl: 1  •  Omega-3 1000 MG capsule, Take 1,000 mg by mouth Daily. otc , Disp: , Rfl:   •  rosuvastatin (CRESTOR) 20 MG tablet, Take 1 tablet by mouth Every Night., Disp: 90 tablet, Rfl: 1  •  ticagrelor (BRILINTA) 90 MG tablet  "tablet, Take 1 tablet by mouth 2 (Two) Times a Day., Disp: 60 tablet, Rfl: 11  •  Vitamin A 3 MG (89629 UT) capsule, Take 10,000 Units by mouth Daily., Disp: , Rfl:     The following portions of the patient's history were reviewed and updated as appropriate: allergies, current medications, past family history, past medical history, past social history, past surgical history and problem list.    ROS  Review of Systems   Constitution: Negative for chills, fever, fatigue, generalized weakness.   Cardiovascular: Negative for chest pain, dyspnea on exertion, leg swelling, palpitations, orthopnea, and syncope.   Respiratory: Negative for cough, shortness of breath, and wheezing.  HENT: Negative for ear pain, nosebleeds, and tinnitus.  Gastrointestinal: Negative for abdominal pain, constipation, diarrhea, nausea and vomiting.   Genitourinary: No urinary symptoms.  Musculoskeletal: Negative for muscle cramps.  Neurological: Negative for dizziness, headaches, loss of balance, numbness, and symptoms of stroke.  Psychiatric: Normal mental status.     All other systems reviewed and are negative.        Objective:     BP 98/68 (BP Location: Right arm, Patient Position: Sitting, Cuff Size: Adult)   Pulse 78   Ht 177.8 cm (70\")   Wt 86.6 kg (191 lb)   SpO2 98%   BMI 27.41 kg/m²      Physical Exam  Constitutional: Patient appears well-developed and well-nourished.   HENT: HEENT exam unremarkable.   Neck: Neck supple. No JVD present. No carotid bruits.   Cardiovascular: Normal rate, regular rhythm and normal heart sounds. No murmur heard.   2+ symmetric pulses.   Pulmonary/Chest: Breath sounds normal. Does not exhibit tenderness.   Abdominal: Abdomen benign.   Musculoskeletal: Does not exhibit edema.   Neurological: Neurological exam unremarkable.   Vitals reviewed.    Data Review:   Lab Results   Component Value Date    GLUCOSE 90 07/08/2021    BUN 15 07/08/2021    CREATININE 1.05 07/08/2021    EGFRIFNONA 71 07/08/2021    BCR " Patient is unaware of any medical problems Paternal Grandfather        ALLERGIES  ALLERGIES:  Patient has no known allergies.    MEDICATIONS  Current Outpatient Medications   Medication Sig Dispense Refill   • pravastatin (PRAVACHOL) 40 MG tablet Take 1 tablet by mouth daily. 30 tablet 1     No current facility-administered medications for this visit.        SLEEP HISTORY-  Stop-Bang  STOP  S: Do you snore loudly (Louder than talking or loud enough to be heard through closed door)?: Yes  T: Do you oftern feel tired, fatigue or sleepy during daytime?: Yes  O: Has anyone observed you stop breathing during your sleep?: Yes  P: Do you have or are you being treated for high blood pressure?: No  BANG  B: BMI more than 35 kg/m2?: Yes  A: Is age over 50 years old?: No  N: Neck circumfrence >16 inches (40 cm)?: Yes  G: Is gender Male?: Yes  Total Score  Total Score (out of 8): 6    REVIEW OF SYSTEMS  Constitutional: Negative for fatigue and fever.   HENT: Negative for nosebleeds and sore throat.    Eyes: Negative for photophobia and visual disturbance.   Respiratory: Negative for choking and shortness of breath.    Cardiovascular: Negative for chest pain and palpitations.   Gastrointestinal: Negative for abdominal distention, nausea and vomiting.   Genitourinary: Negative for decreased urine volume and dysuria.   Musculoskeletal: Negative for back pain and myalgias.   Neurological: Negative for seizures, light-headedness and numbness.   Hematological: Negative for adenopathy. Does not bruise/bleed easily.   Psychiatric/Behavioral: Negative for agitation and confusion.     Patient Reported Vitals  Vitals - Patient Reported  Weight - Patient Reported: (!) 303 lb (137.4 kg)  Height - Patient Reported: 6' 2\" (188 cm)  BMI kg/m2: 38.9     Skin:  without rash.    Head:  Normocephalic-atraumatic.      Eyes: Normal conjunctivae and sclerae.   ENT:  Mucous membranes are moist.  Cardiovascular:  Normal peripheral perfusion.    Respiratory:  Normal respiratory effort.   Gastrointestinal:  Non-distended,   Musculoskeletal:  No deformity or edema  Neurologic:  Oriented times 4.  No focal deficits.     LABS  Recent labs reviewed in Epic and discussed with patient.    Assessment/Plan:  Obesity (BMI 30.0-34.9)  Patient will see us monthly and meet with dietitian and exercise physiologist and provider receive goals and monitor progress.  Patient is willing to make changes and is agreeable with plan.      Dyslipidemia  On meds       Patient Instructions     EXERCISE GOALS:    Daily biking and weight training (bike for 15 minutes, followed by 10-15 min of weights)    PROGRAM GOALS from SURGEON    Mahad has the potential to become an appropriate candidate for weight loss surgery.  They have chosen the sleeve gastrectomy surgery 19817 and I agree.  They must fulfill the following goals in order to undergo bariatric surgery:    Psychology Evaluation- You will need to see a clinical Psychologist or a Psychiatrist in order to be cleared for weight loss surgery.  Please call Psychologist or licensed clinical  Mike Pollack [place] [phone]    Follow Up Visits - Your follow-up care is very important to the success of your procedure.  We will see you monthly and then:  • For the gastric bypass and sleeve---Year 1 - At least 6 times (1-2 weeks, 1, 3, 6, 9 and 12 months after surgery).  ---Year 2 - At least 3-4 times  Labs: you will need labs to check for iron or vitamin deficiencies every 6 months for the first 2 years and then yearly.  • For the Lap-BAND  ---Year 1 - Approximately 9 times on the average (the additional visits are to adjust the BAND and also: 1-2 weeks,1, 2, 3, 6, 9 and 12 months after surgery)  ---Year 2 - At least 3-4 times  ---Labs: you will need labs to check for iron or vitamin deficiencies every 6 months for the first 2 years and then yearly     Subsequent Years for the Gastric bypass, Sleeve or Lap-BAND  14.3 07/08/2021    K 4.4 07/08/2021    CO2 24.8 07/08/2021    CALCIUM 9.0 07/08/2021    ALBUMIN 4.50 07/08/2021    AST 22 07/08/2021    ALT 26 07/08/2021     Lab Results   Component Value Date    CHOL 112 07/08/2021    TRIG 69 07/08/2021    HDL 37 (L) 07/08/2021    LDL 60 07/08/2021      Lab Results   Component Value Date    WBC 7.80 05/02/2021    RBC 4.62 05/02/2021    HGB 13.9 05/02/2021    HCT 43.2 05/02/2021    MCV 93.5 05/02/2021     05/02/2021     Lab Results   Component Value Date    HGBA1C 5.00 04/29/2021        Procedures       Assessment:      Diagnosis Plan   1. Coronary artery disease involving coronary bypass graft of native heart without angina pectoris     2. ST elevation myocardial infarction involving left circumflex coronary artery (CMS/HCC)     3. Mixed hyperlipidemia       Plan:   Continue ASA, Brilinta and statin for CAD  Patient was counseled to begin aerobic exercise 30 min per day for at least 4 days per week.   Continue current medications.   FU in 6 MO, sooner as needed.  Thank you for allowing us to participate in the care of your patient.         Electronically signed by MICHELLE Rolon, 08/06/21, 9:15 AM EDT.      Please note that portions of this note may have been completed with a voice recognition program. Efforts were made to edit the dictations, but occasionally words are mistranscribed.       - At 1-2 times Annually     Pills - You will not be able to take any solid pills for six months after surgery as your new stomach heals.  Pills include prescription medications, over the counter medications (like Tylenol) as well as vitamins.  The only exception would be very small pills such as birth control pills or Synthroid which are approximately 3/8 inch in diameter or smaller for 6 months then 3/4 of an inch for the rest of your life .        TO DO LIST:    1. Smoking and Anti-Smoking Medication - Smoking around the time of surgery, even a small amount, can reduce your ability to heal as well as increase your risk of having significant lung problems (pneumonias as well as others) after surgery.  It is imperative that you quit smoking 100% (no cigarettes or tobacco at all) at least 30 days before surgery.  If you feel you need medication to help stop smoking please ask your doctor for a prescription for an anti-smoking medication.  You may need at least 1-2 urine screens that are negative for nicotine.  2. EKG prior to surgery  3. You will need an upper endoscopy with Dr. Fred Flynn at [facility].  We will help set this up for you.  4. Laboratory Blood Tests - Labs have been entered in Epic.  Please get these tests done but you need to be :12 HOUR FASTING AND NO VITAMINS FOR 3 DAYS  5. Pulmonary Function test   6. Indirect Calorimetry-   Please schedule at [facility], [facility] or our office  7. Chest X-Ray - You will need to have a chest x-ray taken Prior to surgery  8. Ultrasound of the Gallbladder - Please obtain an Ultrasound of the Gallbladder prior to surgery  9. Clearance Note for Surgery - Please obtain a written note from your primary care physician clearing you to have bariatric surgery due to medical issues. Please have the results sent to us.  Prior to surgery  10. Sleep Apea   Please see a sleep specialist for evaluation and treatment  for possible sleep apnea through your doctor.     The following tests and labs are in EPIC:  Orders Placed This Encounter   • EXHALED AIR ANALYSIS   • XR CHEST PA AND LATERAL 2 VIEWS   • US ABDOMEN LIMITED   • CBC with Automated Differential   • Comprehensive Metabolic Panel   • Insulin Level, Fasting   • Vitamin B1, Whole Blood   • Vitamin B12   • Phosphorus   • Parathyroid Hormone Intact Without Calcium   • Thyroid Stimulating Hormone   • Glycohemoglobin   • Helicobacter Pylori Breath Test   • Iron And total Iron Binding Capacity   • Ferritin   • Vitamin D -25 Hydroxy   • SERVICE TO SLEEP MEDICINE   • Electrocardiogram 12-Lead        Diabetes - If you are taking medications for diabetes mellitus or are put on medications for this medical issue prior to surgery.  There is a good chance your diabetes will improve dramatically after surgery as you lose weight; possibly resolve completely.  Unless you are methodically taken off of your medication you could harm yourself.  After surgery you will monitor your blood sugar and will dose your medication based on these numbers.  Prior to surgery, the management of your Diabetes is between you and the doctor who prescribed your medication (normally your primary care physician).  After surgery your surgeon will help guide you for the first 3 months with respect to your medication based on your daily blood sugar checks.  After this three month period, if your diabetes still exists, it should be monitored between you and your doctor who prescribed your original medications.  If you are not already doing so, please begin monitoring your blood sugar and putting this information on your logs at least once a day before the next visit.      High Blood Pressure - If you are taking medications for high blood pressure or are put on medications for this medical issue prior to surgery.  There is a good chance your high blood pressure will improve dramatically after surgery and as you  lose weight.  Unless you are methodically taken off of your medication you could harm yourself.  After surgery you will monitor your blood pressure and will dose your medication based on these numbers.   If you do not have a blood pressure machine at home, then please obtain a blood pressure machine and BRING IT TO THE NEXT CLINIC VISIT. At the next visit our staff will confirm that your machine is accurate and instruct you on proper usage.  Prior to surgery, record your blood pressure on the log sheets.    Informational - Be aware of the following key points:    Goals - If your goals are not met, your surgery date may be cancelled or postponed until they are completed.  •Working after surgery - The normal time off from work is 1-2 full weeks.  If you require a note for work please let us know.  •Lifting after surgery - You will not be able to lift more than 20 lb for at least 4 weeks after surgery.    Surgical date - After your insurance has cleared, you will be contacted (normally by phone) by a member of our office to schedule your surgery.   Final clearance of your surgical procedure is dependent on:  • Completion of your goals    • Insurance clearance    • Hospital admitting interview    Paperwork (read this section carefully) - For non-[facility] tests/labs or other results. You are required to bring copies of all paperwork to the next visit.  Do not rely on any other doctors’ offices, hospitals or facilities to mail or Fax test results to our office.  It is your obligation to obtain copies, and bring them to the next visit even if other facilities mail or fax copies to our office.  Please bring a copy of your exercise and food records also.     Please understand that if these items are not completed within a reasonable time before your surgical date, your surgery will be postponed pending completion.     You will see us next month then please see your primary care monthly for the next 4 months and then you will  see us.  Please discuss with your primary care that they should include your weight, eating plan and activity and exercise.      You will need an upper endoscopy with Dr. Fred Flynn at [facility].  We will help set this up for you.         Return in about 4 weeks (around 11/1/2021) for pre-monthly visit.     Mahad is in [place] and his identity has been established.   He was informed that consent to treat includes permission to submit charges to the applicable insurance on file. Mahad was advised regarding the potential risk inherent in video visits, as the assessment may be limited due to what can be seen on the screen which potentially results in an incomplete assessment; as well as either of us may discontinue the video visit if it is felt that the videoconferencing connections are not adequate for his/her situation.     Electronically signed by: Fred Flynn MD, 10/4/2021

## 2021-10-11 ENCOUNTER — OFFICE VISIT (OUTPATIENT)
Dept: FAMILY MEDICINE CLINIC | Facility: CLINIC | Age: 61
End: 2021-10-11

## 2021-10-11 VITALS
RESPIRATION RATE: 18 BRPM | BODY MASS INDEX: 27.2 KG/M2 | OXYGEN SATURATION: 98 % | WEIGHT: 190 LBS | HEART RATE: 88 BPM | TEMPERATURE: 97.6 F | HEIGHT: 70 IN | DIASTOLIC BLOOD PRESSURE: 72 MMHG | SYSTOLIC BLOOD PRESSURE: 118 MMHG

## 2021-10-11 DIAGNOSIS — L02.32 BOIL OF BUTTOCK: ICD-10-CM

## 2021-10-11 DIAGNOSIS — M54.50 ACUTE BILATERAL LOW BACK PAIN WITHOUT SCIATICA: Primary | ICD-10-CM

## 2021-10-11 PROBLEM — R97.20 RAISED PROSTATE SPECIFIC ANTIGEN: Status: ACTIVE | Noted: 2021-09-13

## 2021-10-11 PROCEDURE — 99213 OFFICE O/P EST LOW 20 MIN: CPT | Performed by: NURSE PRACTITIONER

## 2021-10-11 PROCEDURE — 87077 CULTURE AEROBIC IDENTIFY: CPT | Performed by: NURSE PRACTITIONER

## 2021-10-11 PROCEDURE — 87070 CULTURE OTHR SPECIMN AEROBIC: CPT | Performed by: NURSE PRACTITIONER

## 2021-10-11 PROCEDURE — 87205 SMEAR GRAM STAIN: CPT | Performed by: NURSE PRACTITIONER

## 2021-10-11 PROCEDURE — 87186 SC STD MICRODIL/AGAR DIL: CPT | Performed by: NURSE PRACTITIONER

## 2021-10-11 RX ORDER — ROSUVASTATIN CALCIUM 20 MG/1
TABLET, COATED ORAL
COMMUNITY
End: 2022-08-31

## 2021-10-11 RX ORDER — IBUPROFEN 800 MG/1
800 TABLET ORAL EVERY 8 HOURS PRN
Qty: 90 TABLET | Refills: 0 | Status: SHIPPED | OUTPATIENT
Start: 2021-10-11 | End: 2021-12-17

## 2021-10-11 RX ORDER — ASPIRIN 81 MG/1
TABLET, CHEWABLE ORAL
COMMUNITY
End: 2021-10-11 | Stop reason: SDUPTHER

## 2021-10-11 RX ORDER — CEPHALEXIN 500 MG/1
500 CAPSULE ORAL 2 TIMES DAILY
Qty: 14 CAPSULE | Refills: 0 | Status: SHIPPED | OUTPATIENT
Start: 2021-10-11 | End: 2021-10-18

## 2021-10-11 RX ORDER — CYCLOBENZAPRINE HCL 10 MG
10 TABLET ORAL 3 TIMES DAILY PRN
Qty: 90 TABLET | Refills: 1 | Status: SHIPPED | OUTPATIENT
Start: 2021-10-11 | End: 2021-12-17

## 2021-10-11 NOTE — ASSESSMENT & PLAN NOTE
Wound culture sent   Start Cephalexin and Mupirocin as directed  Follow up with dermatology as scheduled for I & D due to need for holding Brilinta we can not do procedure today.  Cleansed and applied antibiotic cream to site and applied dressing.   Report if any concerning symptoms present such as fever or red streaks from affected area.

## 2021-10-11 NOTE — PROGRESS NOTES
"Chief Complaint  Back Pain and boil on buttocks    Subjective          Jay Guadalupe presents to [facility]  Back Pain  This is a new problem. The current episode started 1 to 4 weeks ago. The problem occurs intermittently. The problem has been gradually worsening since onset. The pain is present in the lumbar spine (worse on right ). The quality of the pain is described as aching and cramping. The pain does not radiate. The pain is at a severity of 4/10. The pain is mild. The pain is worse during the night. The symptoms are aggravated by position and lying down. Stiffness is present in the morning. Pertinent negatives include no fever. Risk factors include recent trauma (playing with grandson and lifted wrong). He has tried analgesics, bed rest, heat and NSAIDs for the symptoms. The treatment provided mild relief.   Cyst  This is a recurrent problem. The current episode started in the past 7 days. The problem has been gradually worsening. Pertinent negatives include no chills, fever or rash. Associated symptoms comments: Tender, reddened, purulent drainage. The symptoms are aggravated by exertion (sitting, lying). He has tried heat, position changes and rest for the symptoms. The treatment provided mild relief.       Objective   Vital Signs:   /72   Pulse 88   Temp 97.6 °F (36.4 °C)   Resp 18   Ht 177.8 cm (70\")   Wt 86.2 kg (190 lb)   SpO2 98%   BMI 27.26 kg/m²     Physical Exam  Vitals and nursing note reviewed.   Constitutional:       General: He is not in acute distress.     Appearance: Normal appearance.   HENT:      Head: Normocephalic.      Right Ear: External ear normal.      Left Ear: External ear normal.      Nose: Nose normal.   Eyes:      Extraocular Movements: Extraocular movements intact.      Conjunctiva/sclera: Conjunctivae normal.      Pupils: Pupils are equal, round, and reactive to light.   Cardiovascular:      Rate and Rhythm: Regular rhythm.      " Heart sounds: Normal heart sounds.   Pulmonary:      Effort: Pulmonary effort is normal.      Breath sounds: Normal breath sounds.   Musculoskeletal:      Right lower leg: No edema.      Left lower leg: No edema.   Skin:     General: Skin is warm and dry.      Findings: Abscess present.          Neurological:      Mental Status: He is alert and oriented to person, place, and time.   Psychiatric:         Mood and Affect: Mood normal.         Behavior: Behavior normal.         Thought Content: Thought content normal.         Judgment: Judgment normal.        Result Review :     Common labs    Common Labsle 4/30/21 4/30/21 5/2/21 5/2/21 7/8/21 7/8/21 7/8/21    0540 0540 1034 1034 1040 1040 1040   Glucose  107 (A)  85  90    BUN  12  15  15    Creatinine  0.90  1.12  1.05    eGFR Non  Am  86  66  71    Sodium  139  142  140    Potassium  4.0  4.5  4.4    Chloride  106  105  106    Calcium  8.4 (A)  9.3  9.0    Albumin      4.50    Total Bilirubin      0.5    Alkaline Phosphatase      61    AST (SGOT)      22    ALT (SGPT)      26    WBC 10.89 (A)  7.80       Hemoglobin 13.1  13.9       Hematocrit 40.8  43.2       Platelets 224  237       Total Cholesterol     112     Triglycerides     69     HDL Cholesterol     37 (A)     LDL Cholesterol      60     PSA       8.980 (A)   (A) Abnormal value            Data reviewed: previous office visit notes          Assessment and Plan    Diagnoses and all orders for this visit:    1. Acute bilateral low back pain without sciatica (Primary)  Assessment & Plan:  Take Tylenol or Motrin PRN pain as directed  Heat/Ice therapy 15-20 minutes 2-3 times daily PRN pain  Suggested Chiropractic evaluation and treatment  Muscle relaxer as prescribed  Lumbar x-ray upcoming  Back exercises suggested and materials printed  RTO or call PRN persistent or worsening pain    Orders:  -     XR Spine Lumbar 4+ View  -     cyclobenzaprine (FLEXERIL) 10 MG tablet; Take 1 tablet by mouth 3 (Three)  Times a Day As Needed for Muscle Spasms.  Dispense: 90 tablet; Refill: 1  -     ibuprofen (ADVIL,MOTRIN) 800 MG tablet; Take 1 tablet by mouth Every 8 (Eight) Hours As Needed for Mild Pain  or Moderate Pain .  Dispense: 90 tablet; Refill: 0    2. Boil of buttock  Assessment & Plan:  Wound culture sent   Start Cephalexin and Mupirocin as directed  Follow up with dermatology as scheduled for I & D due to need for holding Brilinta we can not do procedure today.  Cleansed and applied antibiotic cream to site and applied dressing.   Report if any concerning symptoms present such as fever or red streaks from affected area.    Orders:  -     cephalexin (Keflex) 500 MG capsule; Take 1 capsule by mouth 2 (Two) Times a Day for 7 days.  Dispense: 14 capsule; Refill: 0  -     mupirocin (Bactroban) 2 % ointment; Apply 1 application topically to the appropriate area as directed 2 (Two) Times a Day.  Dispense: 44 g; Refill: 1  -     Wound Culture - Wound, Buttock, Left    I spent 31 minutes caring for Jay on this date of service. This time includes time spent by me in the following activities:preparing for the visit, obtaining and/or reviewing a separately obtained history, performing a medically appropriate examination and/or evaluation , counseling and educating the patient/family/caregiver, ordering medications, tests, or procedures and documenting information in the medical record  Follow Up   Return if symptoms worsen or fail to improve.  Patient was given instructions and counseling regarding his condition or for health maintenance advice. Please see specific information pulled into the AVS if appropriate.

## 2021-10-11 NOTE — ASSESSMENT & PLAN NOTE
Take Tylenol or Motrin PRN pain as directed  Heat/Ice therapy 15-20 minutes 2-3 times daily PRN pain  Suggested Chiropractic evaluation and treatment  Muscle relaxer as prescribed  Lumbar x-ray upcoming  Back exercises suggested and materials printed  RTO or call PRN persistent or worsening pain

## 2021-10-12 ENCOUNTER — HOSPITAL ENCOUNTER (OUTPATIENT)
Dept: GENERAL RADIOLOGY | Facility: HOSPITAL | Age: 61
Discharge: HOME | End: 2021-10-12
Admitting: NURSE PRACTITIONER

## 2021-10-12 PROCEDURE — 72110 X-RAY EXAM L-2 SPINE 4/>VWS: CPT

## 2021-10-13 LAB
BACTERIA SPEC AEROBE CULT: ABNORMAL
GRAM STN SPEC: ABNORMAL
GRAM STN SPEC: ABNORMAL

## 2021-10-15 DIAGNOSIS — M47.816 FACET ARTHRITIS OF LUMBAR REGION: ICD-10-CM

## 2021-10-15 DIAGNOSIS — M47.816 SPONDYLOSIS OF LUMBAR SPINE: Primary | ICD-10-CM

## 2021-10-15 DIAGNOSIS — M54.50 ACUTE BILATERAL LOW BACK PAIN WITHOUT SCIATICA: ICD-10-CM

## 2021-11-03 ENCOUNTER — TELEPHONE (OUTPATIENT)
Dept: CARDIOLOGY | Facility: HOSPITAL | Age: 61
End: 2021-11-03

## 2021-11-03 ENCOUNTER — OFFICE VISIT (OUTPATIENT)
Dept: FAMILY MEDICINE CLINIC | Facility: CLINIC | Age: 61
End: 2021-11-03

## 2021-11-03 VITALS
RESPIRATION RATE: 16 BRPM | DIASTOLIC BLOOD PRESSURE: 58 MMHG | BODY MASS INDEX: 27.69 KG/M2 | SYSTOLIC BLOOD PRESSURE: 102 MMHG | HEART RATE: 65 BPM | OXYGEN SATURATION: 98 % | HEIGHT: 70 IN | WEIGHT: 193.4 LBS | TEMPERATURE: 97.7 F

## 2021-11-03 DIAGNOSIS — R50.9 FEVER, UNSPECIFIED FEVER CAUSE: Primary | ICD-10-CM

## 2021-11-03 DIAGNOSIS — E78.5 DYSLIPIDEMIA: ICD-10-CM

## 2021-11-03 DIAGNOSIS — Z12.5 PROSTATE CANCER SCREENING: ICD-10-CM

## 2021-11-03 PROCEDURE — 99213 OFFICE O/P EST LOW 20 MIN: CPT | Performed by: FAMILY MEDICINE

## 2021-11-03 NOTE — PROGRESS NOTES
"Chief Complaint  Fatigue and Fever (low grade at night and face gets flushed)    Subjective          Jay Guadalupe presents to [facility]  History of Present Illness    Jay Guadalupe is a pleasant male who presents today for a follow-up.    Fever/Flushing  Mr. Guadalupe comes to the clinic today stating that he has been experiencing flushing and subjective fevers, particularly at night. He reports that he has had these symptoms almost every night for the past 3 to 4 weeks. He adds that this often presents after he showers and sits down to watch television. At that time, his face becomes red and hot. The patient states that this is resolved when he wakes in the morning. He denies any introducing any new medications or supplements to his regimen. He is currently on a vitamin for his eyes that he has been taking for 10 years, his cholesterol medication, aspirin, and Brillinta. The patient states that he began to experience nausea today for the first time. He adds that he was eating breakfast this morning, and he could not finish it. He was nauseated for approximately 2 hours and his wife gave him some of her prescribed nausea medication. He denies any weight loss, swollen joints, blood in his stool, or urinary frequency.     Health maintenance  He reports that he had his last blood work performed in 07/2021. He adds that he went to the urologist. He is requesting that a PSA be added to his labs for his upcoming urology follow-up appointment. He endorses having the influenza vaccine. He expresses hesitancy regarding having the COVID-19 booster vaccine.     Objective   Vital Signs:   /58   Pulse 65   Temp 97.7 °F (36.5 °C)   Resp 16   Ht 177.8 cm (70\")   Wt 87.7 kg (193 lb 6.4 oz)   SpO2 98%   BMI 27.75 kg/m²     Physical Exam  Vitals and nursing note reviewed.   Constitutional:       Appearance: He is well-developed.   HENT:      Head: Normocephalic.      Right Ear: External " ear normal.      Left Ear: External ear normal.      Nose: Nose normal.   Eyes:      General: No scleral icterus.     Conjunctiva/sclera: Conjunctivae normal.      Pupils: Pupils are equal, round, and reactive to light.   Neck:      Thyroid: No thyromegaly.      Vascular: No carotid bruit.   Cardiovascular:      Rate and Rhythm: Normal rate and regular rhythm.   Pulmonary:      Effort: Pulmonary effort is normal.      Breath sounds: Normal breath sounds.   Musculoskeletal:         General: Normal range of motion.      Cervical back: Neck supple.   Skin:     General: Skin is warm and dry.      Findings: No rash.   Neurological:      Mental Status: He is alert and oriented to person, place, and time.      Comments: No focal deficits no lateralizing signs   Psychiatric:         Behavior: Behavior is cooperative.        Result Review :                 Assessment and Plan    Diagnoses and all orders for this visit:    1. Fever, unspecified fever cause (Primary)  -     Comprehensive Metabolic Panel; Future  -     CBC (No Diff); Future  -     Sedimentation Rate; Future    2. Prostate cancer screening  -     PSA Screen; Future    3. Dyslipidemia  -     Lipid Panel; Future  -     TSH; Future        Follow Up   No follow-ups on file.  Patient was given instructions and counseling regarding his condition or for health maintenance advice. Please see specific information pulled into the AVS if appropriate.     Transcribed from ambient dictation for Ronny Avilez MD by Frances Borden.  11/03/21   15:07 EDT    I have personally performed the services described in this document as transcribed by the above individual, and it is both accurate and complete.  Ronny Avilez MD  11/4/2021  08:08 EDT

## 2021-11-03 NOTE — TELEPHONE ENCOUNTER
[facility] Telephone Note for:    Jay Guadalupe, 1960  Home Phone [phone]   Mobile [phone]       Reason for Call:  Patient left voicemail wanting to know if he needed to have an appointment in the clinic.     Symptoms:  Patient states that he has been experiencing fatigue and hot flashes especially during the night. He states that he has had a low grade temperature (99 degree farenheit). No muscle aches, No cough. This morning patient had some nausea. No chest pain. No dizziness/lightheadedness.     Onset::  3 weeks    Anything Tried?:   No    Other Pertinent Information (Weight, Vitals, etc.):  Has not been around anyone that has tested positive positive for Covid or been tested.

## 2021-11-04 ENCOUNTER — LAB (OUTPATIENT)
Dept: LAB | Facility: HOSPITAL | Age: 61
End: 2021-11-04

## 2021-11-04 DIAGNOSIS — Z12.5 PROSTATE CANCER SCREENING: ICD-10-CM

## 2021-11-04 DIAGNOSIS — R50.9 FEVER, UNSPECIFIED FEVER CAUSE: ICD-10-CM

## 2021-11-04 DIAGNOSIS — E78.5 DYSLIPIDEMIA: ICD-10-CM

## 2021-11-04 PROBLEM — L02.32 BOIL OF BUTTOCK: Status: RESOLVED | Noted: 2021-10-11 | Resolved: 2021-11-04

## 2021-11-04 LAB
ALBUMIN SERPL-MCNC: 4.5 G/DL (ref 3.5–5.2)
ALBUMIN/GLOB SERPL: 1.8 G/DL
ALP SERPL-CCNC: 60 U/L (ref 39–117)
ALT SERPL W P-5'-P-CCNC: 24 U/L (ref 1–41)
ANION GAP SERPL CALCULATED.3IONS-SCNC: 6.3 MMOL/L (ref 5–15)
AST SERPL-CCNC: 21 U/L (ref 1–40)
BILIRUB SERPL-MCNC: 0.4 MG/DL (ref 0–1.2)
BUN SERPL-MCNC: 15 MG/DL (ref 8–23)
BUN/CREAT SERPL: 13.2 (ref 7–25)
CALCIUM SPEC-SCNC: 9.2 MG/DL (ref 8.6–10.5)
CHLORIDE SERPL-SCNC: 106 MMOL/L (ref 98–107)
CHOLEST SERPL-MCNC: 101 MG/DL (ref 0–200)
CO2 SERPL-SCNC: 27.7 MMOL/L (ref 22–29)
CREAT SERPL-MCNC: 1.14 MG/DL (ref 0.76–1.27)
DEPRECATED RDW RBC AUTO: 40.2 FL (ref 37–54)
ERYTHROCYTE [DISTWIDTH] IN BLOOD BY AUTOMATED COUNT: 12.3 % (ref 12.3–15.4)
ERYTHROCYTE [SEDIMENTATION RATE] IN BLOOD: 7 MM/HR (ref 0–20)
GFR SERPL CREATININE-BSD FRML MDRD: 65 ML/MIN/1.73
GLOBULIN UR ELPH-MCNC: 2.5 GM/DL
GLUCOSE SERPL-MCNC: 94 MG/DL (ref 65–99)
HCT VFR BLD AUTO: 42.6 % (ref 37.5–51)
HDLC SERPL-MCNC: 35 MG/DL (ref 40–60)
HGB BLD-MCNC: 14.6 G/DL (ref 13–17.7)
LDLC SERPL CALC-MCNC: 51 MG/DL (ref 0–100)
LDLC/HDLC SERPL: 1.49 {RATIO}
MCH RBC QN AUTO: 30.7 PG (ref 26.6–33)
MCHC RBC AUTO-ENTMCNC: 34.3 G/DL (ref 31.5–35.7)
MCV RBC AUTO: 89.7 FL (ref 79–97)
PLATELET # BLD AUTO: 223 10*3/MM3 (ref 140–450)
PMV BLD AUTO: 10.2 FL (ref 6–12)
POTASSIUM SERPL-SCNC: 4.3 MMOL/L (ref 3.5–5.2)
PROT SERPL-MCNC: 7 G/DL (ref 6–8.5)
PSA SERPL-MCNC: 9.07 NG/ML (ref 0–4)
RBC # BLD AUTO: 4.75 10*6/MM3 (ref 4.14–5.8)
SODIUM SERPL-SCNC: 140 MMOL/L (ref 136–145)
TRIGL SERPL-MCNC: 70 MG/DL (ref 0–150)
TSH SERPL DL<=0.05 MIU/L-ACNC: 3.45 UIU/ML (ref 0.27–4.2)
VLDLC SERPL-MCNC: 15 MG/DL (ref 5–40)
WBC # BLD AUTO: 7.41 10*3/MM3 (ref 3.4–10.8)

## 2021-11-04 PROCEDURE — 36415 COLL VENOUS BLD VENIPUNCTURE: CPT

## 2021-11-04 PROCEDURE — 80053 COMPREHEN METABOLIC PANEL: CPT

## 2021-11-04 PROCEDURE — 84443 ASSAY THYROID STIM HORMONE: CPT

## 2021-11-04 PROCEDURE — 85652 RBC SED RATE AUTOMATED: CPT

## 2021-11-04 PROCEDURE — 85027 COMPLETE CBC AUTOMATED: CPT

## 2021-11-04 PROCEDURE — G0103 PSA SCREENING: HCPCS

## 2021-11-04 PROCEDURE — 80061 LIPID PANEL: CPT

## 2021-11-19 ENCOUNTER — TELEPHONE (OUTPATIENT)
Dept: CARDIOLOGY | Facility: HOSPITAL | Age: 61
End: 2021-11-19

## 2021-11-30 ENCOUNTER — APPOINTMENT (OUTPATIENT)
Dept: CARDIAC REHAB | Facility: HOSPITAL | Age: 61
End: 2021-11-30

## 2021-11-30 ENCOUNTER — LAB (OUTPATIENT)
Dept: LAB | Facility: HOSPITAL | Age: 61
End: 2021-11-30

## 2021-11-30 ENCOUNTER — TRANSCRIBE ORDERS (OUTPATIENT)
Dept: LAB | Facility: HOSPITAL | Age: 61
End: 2021-11-30

## 2021-11-30 DIAGNOSIS — Z01.818 OTHER SPECIFIED PRE-OPERATIVE EXAMINATION: Primary | ICD-10-CM

## 2021-11-30 DIAGNOSIS — Z01.818 OTHER SPECIFIED PRE-OPERATIVE EXAMINATION: ICD-10-CM

## 2021-11-30 PROCEDURE — 80048 BASIC METABOLIC PNL TOTAL CA: CPT

## 2021-11-30 PROCEDURE — 36415 COLL VENOUS BLD VENIPUNCTURE: CPT

## 2021-11-30 PROCEDURE — 85027 COMPLETE CBC AUTOMATED: CPT

## 2021-12-01 LAB
ANION GAP SERPL CALCULATED.3IONS-SCNC: 8.8 MMOL/L (ref 5–15)
BUN SERPL-MCNC: 12 MG/DL (ref 8–23)
BUN/CREAT SERPL: 12 (ref 7–25)
CALCIUM SPEC-SCNC: 9.1 MG/DL (ref 8.6–10.5)
CHLORIDE SERPL-SCNC: 105 MMOL/L (ref 98–107)
CO2 SERPL-SCNC: 26.2 MMOL/L (ref 22–29)
CREAT SERPL-MCNC: 1 MG/DL (ref 0.76–1.27)
DEPRECATED RDW RBC AUTO: 42.5 FL (ref 37–54)
ERYTHROCYTE [DISTWIDTH] IN BLOOD BY AUTOMATED COUNT: 12.4 % (ref 12.3–15.4)
GFR SERPL CREATININE-BSD FRML MDRD: 75 ML/MIN/1.73
GLUCOSE SERPL-MCNC: 88 MG/DL (ref 65–99)
HCT VFR BLD AUTO: 45 % (ref 37.5–51)
HGB BLD-MCNC: 14.6 G/DL (ref 13–17.7)
MCH RBC QN AUTO: 30.2 PG (ref 26.6–33)
MCHC RBC AUTO-ENTMCNC: 32.4 G/DL (ref 31.5–35.7)
MCV RBC AUTO: 93.2 FL (ref 79–97)
PLATELET # BLD AUTO: 221 10*3/MM3 (ref 140–450)
PMV BLD AUTO: 10.1 FL (ref 6–12)
POTASSIUM SERPL-SCNC: 4.1 MMOL/L (ref 3.5–5.2)
RBC # BLD AUTO: 4.83 10*6/MM3 (ref 4.14–5.8)
SODIUM SERPL-SCNC: 140 MMOL/L (ref 136–145)
WBC NRBC COR # BLD: 5.58 10*3/MM3 (ref 3.4–10.8)

## 2021-12-02 ENCOUNTER — APPOINTMENT (OUTPATIENT)
Dept: CARDIAC REHAB | Facility: HOSPITAL | Age: 61
End: 2021-12-02

## 2021-12-03 ENCOUNTER — TELEPHONE (OUTPATIENT)
Dept: FAMILY MEDICINE CLINIC | Facility: CLINIC | Age: 61
End: 2021-12-03

## 2021-12-03 NOTE — TELEPHONE ENCOUNTER
Caller: GAYLE WITH [facility]     Relationship:   N/A  Best call back number: [phone]    What form or medical record are you requesting: COPY OF THE PT LAST APPT NOTES    Who is requesting this form or medical record from you:   [facility]    How would you like to receive the form or medical records (pick-up, mail, fax):   If fax, what is the fax number: [phone]  If mail, what is the address:   If pick-up, provide patient with address and location details    Timeframe paperwork needed: ASAP    Additional notes:

## 2021-12-03 NOTE — TELEPHONE ENCOUNTER
I called Madelaine and told her she would have to request this through Medical records dept, she said this is for a knee scope, I dont see that we have referred patient for this.

## 2021-12-07 ENCOUNTER — APPOINTMENT (OUTPATIENT)
Dept: CARDIAC REHAB | Facility: HOSPITAL | Age: 61
End: 2021-12-07

## 2021-12-09 ENCOUNTER — APPOINTMENT (OUTPATIENT)
Dept: CARDIAC REHAB | Facility: HOSPITAL | Age: 61
End: 2021-12-09

## 2021-12-14 ENCOUNTER — APPOINTMENT (OUTPATIENT)
Dept: CARDIAC REHAB | Facility: HOSPITAL | Age: 61
End: 2021-12-14

## 2021-12-16 ENCOUNTER — APPOINTMENT (OUTPATIENT)
Dept: CARDIAC REHAB | Facility: HOSPITAL | Age: 61
End: 2021-12-16

## 2021-12-17 ENCOUNTER — OFFICE VISIT (OUTPATIENT)
Dept: NEUROSURGERY | Facility: CLINIC | Age: 61
End: 2021-12-17

## 2021-12-17 VITALS
HEIGHT: 70 IN | TEMPERATURE: 97.5 F | SYSTOLIC BLOOD PRESSURE: 102 MMHG | DIASTOLIC BLOOD PRESSURE: 62 MMHG | BODY MASS INDEX: 27.32 KG/M2 | WEIGHT: 190.8 LBS

## 2021-12-17 DIAGNOSIS — M51.36 DISC DEGENERATION, LUMBAR: Primary | ICD-10-CM

## 2021-12-17 DIAGNOSIS — M54.9 MECHANICAL BACK PAIN: ICD-10-CM

## 2021-12-17 PROCEDURE — 99203 OFFICE O/P NEW LOW 30 MIN: CPT | Performed by: PHYSICIAN ASSISTANT

## 2021-12-17 NOTE — PROGRESS NOTES
Patient: Jay Guadalupe  : 1960  Chart #: [id number]    Date of Service: 2021    CHIEF COMPLAINT: Low back pain    History of Present Illness Mr. 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  He is new to our clinic.  Couple months ago he was playing with his grandson and developed severe low back pain.  With time his pain has settled down.  He does continue with stiffness and discomfort early in the mornings as well as after repetitive lifting.  He denies radicular type symptoms.  Heat, rest and back exercises have helped with his symptoms.  Lifting again tends to aggravate things the most.  His wife is a occupational therapist and has been helping him with exercises.  He has lost some weight over the past year and has been focusing on core strengthening.  He is mainly looking for reassurance that nothing dangerous is going on.  He denies focal weakness or bowel or bladder difficulties.      Past Medical History:   Diagnosis Date   • Enlarged prostate          Current Outpatient Medications:   •  aspirin 81 MG chewable tablet, Chew 1 tablet Daily., Disp: 100 tablet, Rfl: 3  •  nitroglycerin (NITROSTAT) 0.4 MG SL tablet, Place 1 under the tongue as needed for chest pain, may repeat every 5 minutes for up three doses, Disp: 100 tablet, Rfl: 1  •  Omega-3 1000 MG capsule, Take 1,000 mg by mouth Daily. otc , Disp: , Rfl:   •  rosuvastatin (Crestor) 20 MG tablet, Crestor 20 mg tablet  Take 1 tablet every day by oral route., Disp: , Rfl:   •  ticagrelor (BRILINTA) 90 MG tablet tablet, Take 1 tablet by mouth 2 (Two) Times a Day., Disp: 180 tablet, Rfl: 3  •  Vitamin A 3 MG (00220 UT) capsule, Take 10,000 Units by mouth Daily., Disp: , Rfl:     Past Surgical History:   Procedure Laterality Date   • CARDIAC CATHETERIZATION N/A 2021    Procedure: Left Heart Cath;  Surgeon: Zaira Kerns MD;  Location: [facility] CATH INVASIVE LOCATION;  Service: Cardiovascular;   "Laterality: N/A;   • SHOULDER SURGERY         Social History     Socioeconomic History   • Marital status:    Tobacco Use   • Smoking status: Never Smoker   • Smokeless tobacco: Never Used   Vaping Use   • Vaping Use: Never used   Substance and Sexual Activity   • Alcohol use: No   • Drug use: No   • Sexual activity: Defer         Review of Systems   Musculoskeletal: Positive for back pain.   All other systems reviewed and are negative.      Objective   Vital Signs: Blood pressure 102/62, temperature 97.5 °F (36.4 °C), temperature source Infrared, height 177.8 cm (70\"), weight 86.5 kg (190 lb 12.8 oz).  Physical Exam  Vitals and nursing note reviewed.   Constitutional:       General: He is not in acute distress.     Appearance: He is well-developed.   HENT:      Head: Normocephalic and atraumatic.   Eyes:      Pupils: Pupils are equal, round, and reactive to light.   Psychiatric:         Behavior: Behavior normal.         Thought Content: Thought content normal.     Musculoskeletal:     Strength is intact in upper and lower extremities to direct testing.     Station and gait are normal.  Neurologic:     Muscle tone is normal throughout.     Coordination is intact.     Sensation is intact to light touch throughout.     Patient is oriented to person, place, and time.         Independent review of radiographic imaging: Plain films of the lumbar spine demonstrate normal vertebral alignment.  Adequate disc space at each level.  No evidence of fracture    Assessment/Plan   Diagnosis: Mechanical low back pain    Medical Decision Making: Patient has low back pain, worse in the mornings, and gets better as he moves around consistent with degenerative osteoarthritis.  He is prone to flareups from time to time.  I have discussed management options with him including daily low impact exercises, core strengthening and nonsteroidals and muscle relaxers when symptoms are flared up.  If symptoms flareup again I would be " happy to see him in follow-up.  He will be seen on as-needed basis.    Diagnoses and all orders for this visit:    1. Disc degeneration, lumbar (Primary)    2. Mechanical back pain    3. BMI 27.0-27.9,adult                        Patient's Body mass index is 27.38 kg/m². indicating that he is overweight (BMI 25-29.9). Obesity-related health conditions include the following: coronary heart disease and dyslipidemias. Obesity is unchanged. BMI is is above average; BMI management plan is completed. We discussed portion control and increasing exercise..         Eda Brandon PA-C  Patient Care Team:  Ronny Avilez MD as PCP - General (Family Medicine)  Ronny Avilez MD as Referring Physician (Family Medicine)

## 2021-12-21 ENCOUNTER — APPOINTMENT (OUTPATIENT)
Dept: CARDIAC REHAB | Facility: HOSPITAL | Age: 61
End: 2021-12-21

## 2021-12-23 ENCOUNTER — APPOINTMENT (OUTPATIENT)
Dept: CARDIAC REHAB | Facility: HOSPITAL | Age: 61
End: 2021-12-23

## 2021-12-28 ENCOUNTER — APPOINTMENT (OUTPATIENT)
Dept: CARDIAC REHAB | Facility: HOSPITAL | Age: 61
End: 2021-12-28

## 2021-12-30 ENCOUNTER — APPOINTMENT (OUTPATIENT)
Dept: CARDIAC REHAB | Facility: HOSPITAL | Age: 61
End: 2021-12-30

## 2022-03-10 NOTE — PROGRESS NOTES
[facility]    Encounter Date: 2022    Patient ID: Jay Guadalupe is a 61 y.o. male.  : 1960     PCP: Ronny Avilez MD       Chief Complaint: Coronary Artery Disease      PROBLEM LIST:  1. Coronary artery disease  a. STEMI, 2021  b. LHC, 2021: EF 60%. 100% occlusion of infarct-related mid left circumflex coronary artery. Successful thrombectomy followed by PTCA and placement of 3.0 x 23 mm BENITEZ to mid circumflex reducing 100% stenosis to 0%. 90% stenosis of the distal LAD successfully stented with 2.5 x 23 mm BENITEZ postdilated to 3.0 mm and reduced to 0%. Residual mild nonobstructive plaque of the proximal LAD, moderate nonobstructive plaque of the mid RCA and 60 to 70% stenosis of the distal RCA. Mild high lateral hypokinesis.   c. Echocardiogram, 2021: EF 55%. Mild concentric LVH. Mild MR. Trace TR with normal RVSP.   2. Dyslipidemia     History of Present Illness  Patient presents today for a 6 month follow-up with a history of coronary artery disease and cardiac risk factors. Since last visit, he has done fairly well.  He had a Left knee arthroscopy and is just now getting back to walking 2 miles per day.  He denies any symptoms of chest pain, unusual shortness of breath, palpitations, or edema.  His blood pressure has been well controlled at home.  His lipid panel in  was acceptable on his current dose of Crestor.    No Known Allergies      Current Outpatient Medications:   •  aspirin 81 MG chewable tablet, Chew 1 tablet Daily., Disp: 100 tablet, Rfl: 3  •  nitroglycerin (NITROSTAT) 0.4 MG SL tablet, Place 1 under the tongue as needed for chest pain, may repeat every 5 minutes for up three doses, Disp: 100 tablet, Rfl: 1  •  rosuvastatin (CRESTOR) 20 MG tablet, Crestor 20 mg tablet  Take 1 tablet every day by oral route., Disp: , Rfl:   •  ticagrelor (BRILINTA) 90 MG tablet tablet, Take 1 tablet by mouth 2 (Two) Times a Day., Disp: 180  "tablet, Rfl: 3  •  Vitamin A 3 MG (81709 UT) capsule, Take 10,000 Units by mouth Daily., Disp: , Rfl:     The following portions of the patient's history were reviewed and updated as appropriate: allergies, current medications, past family history, past medical history, past social history, past surgical history and problem list.    ROS  Review of Systems   Constitution: Negative for chills, fever, fatigue, generalized weakness.   Cardiovascular: Negative for chest pain, dyspnea on exertion, leg swelling, palpitations, orthopnea, and syncope.   Respiratory: Negative for cough, shortness of breath, and wheezing.  HENT: Negative for ear pain, nosebleeds, and tinnitus.  Gastrointestinal: Negative for abdominal pain, constipation, diarrhea, nausea and vomiting.   Genitourinary: No urinary symptoms.  Musculoskeletal: Negative for muscle cramps.  Neurological: Negative for dizziness, headaches, loss of balance, numbness, and symptoms of stroke.  Psychiatric: Normal mental status.     All other systems reviewed and are negative.        Objective:     /84 (BP Location: Right arm, Patient Position: Sitting)   Pulse 71   Ht 177.8 cm (70\")   Wt 90.1 kg (198 lb 9.6 oz)   SpO2 99%   BMI 28.50 kg/m²      Physical Exam  Constitutional: Patient appears well-developed and well-nourished.   HENT: HEENT exam unremarkable.   Neck: Neck supple. No JVD present. No carotid bruits.   Cardiovascular: Normal rate, regular rhythm and normal heart sounds. No murmur heard.   2+ symmetric pulses.   Pulmonary/Chest: Breath sounds normal. Does not exhibit tenderness.   Abdominal: Abdomen benign.   Musculoskeletal: Does not exhibit edema.   Neurological: Neurological exam unremarkable.   Vitals reviewed.    Data Review:   Lab Results   Component Value Date    GLUCOSE 88 11/30/2021    BUN 12 11/30/2021    CREATININE 1.00 11/30/2021    EGFRIFNONA 75 11/30/2021    BCR 12.0 11/30/2021    K 4.1 11/30/2021    CO2 26.2 11/30/2021    CALCIUM 9.1 " 11/30/2021    ALBUMIN 4.50 11/04/2021    AST 21 11/04/2021    ALT 24 11/04/2021     Lab Results   Component Value Date    CHOL 101 11/04/2021    TRIG 70 11/04/2021    HDL 35 (L) 11/04/2021    LDL 51 11/04/2021      Lab Results   Component Value Date    WBC 5.58 11/30/2021    RBC 4.83 11/30/2021    HGB 14.6 11/30/2021    HCT 45.0 11/30/2021    MCV 93.2 11/30/2021     11/30/2021     Lab Results   Component Value Date    TSH 3.450 11/04/2021     Lab Results   Component Value Date    HGBA1C 5.00 04/29/2021        Procedures       Assessment:      Diagnosis Plan   1. Coronary artery disease involving native coronary artery of native heart without angina pectoris  Stable without symptoms- continue Brilinta and ASA.   2. Dyslipidemia  Continue Crestor     Plan:   Stable from cardiac standpoint, no current angina or CHF type symptoms.  Diet and Exercise recommended.  Continue current medications.  FU in 6 MO, sooner as needed. We will plan on discontinuing his Brilinta at that time.  Thank you for allowing us to participate in the care of your patient.       Scribed for Zaira Kerns MD by Trang Hardwick RN. 3/11/2022  14:50 EST    I, Zaira Kerns MD, personally performed the services described in this documentation as scribed by the above named individual in my presence, and it is both accurate and complete.  3/11/2022  14:50 EST        Part of this note may be an electronic transcription/translation of spoken language to printed text using the Dragon Dictation System.

## 2022-03-11 ENCOUNTER — OFFICE VISIT (OUTPATIENT)
Dept: CARDIOLOGY | Facility: CLINIC | Age: 62
End: 2022-03-11

## 2022-03-11 VITALS
WEIGHT: 198.6 LBS | OXYGEN SATURATION: 99 % | HEIGHT: 70 IN | BODY MASS INDEX: 28.43 KG/M2 | SYSTOLIC BLOOD PRESSURE: 127 MMHG | DIASTOLIC BLOOD PRESSURE: 84 MMHG | HEART RATE: 71 BPM

## 2022-03-11 DIAGNOSIS — E78.5 DYSLIPIDEMIA: ICD-10-CM

## 2022-03-11 DIAGNOSIS — I25.10 CORONARY ARTERY DISEASE INVOLVING NATIVE CORONARY ARTERY OF NATIVE HEART WITHOUT ANGINA PECTORIS: Primary | ICD-10-CM

## 2022-03-11 PROCEDURE — 99213 OFFICE O/P EST LOW 20 MIN: CPT | Performed by: INTERNAL MEDICINE

## 2022-08-31 RX ORDER — ROSUVASTATIN CALCIUM 20 MG/1
TABLET, COATED ORAL
Qty: 90 TABLET | Refills: 3 | Status: SHIPPED | OUTPATIENT
Start: 2022-08-31

## 2022-10-11 NOTE — PROGRESS NOTES
[facility]    Encounter Date: 10/14/2022    Patient ID: Jay Guadalupe is a 62 y.o. male.  : 1960     PCP: Ronny Avilez MD       Chief Complaint: Coronary artery disease involving native coronary artery of      PROBLEM LIST:  1. Coronary artery disease  a. STEMI, 2021  b. LHC, 2021: EF 60%. 100% occlusion of infarct-related mid left circumflex coronary artery. Successful thrombectomy followed by PTCA and placement of 3.0 x 23 mm BENITEZ to mid circumflex reducing 100% stenosis to 0%. 90% stenosis of the distal LAD successfully stented with 2.5 x 23 mm BENITEZ postdilated to 3.0 mm and reduced to 0%. Residual mild nonobstructive plaque of the proximal LAD, moderate nonobstructive plaque of the mid RCA and 60 to 70% stenosis of the distal RCA. Mild high lateral hypokinesis.   c. Echo, 2021: EF 55%. Mild concentric LVH. Mild MR. Trace TR with normal RVSP.   2. Dyslipidemia     History of Present Illness  Patient presents today for a follow-up with a history of coronary artery disease and cardiac risk factors. Since last visit, patient has been doing well overall from a cardiovascular standpoint. He stays busy and active despite being retired since 2020. 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  Until recently he was also helping with his father's care who has now passed away.  Patient inquired whether further testing is indicated to reassess his residual nonobstructive disease.  Patient denies chest pain, shortness of breath, orthopnea, palpitations, edema, dizziness, and syncope.     No Known Allergies      Current Outpatient Medications:   •  aspirin 81 MG chewable tablet, Chew 1 tablet Daily., Disp: 100 tablet, Rfl: 3  •  nitroglycerin (NITROSTAT) 0.4 MG SL tablet, Place 1 under the tongue as needed for chest pain, may repeat every 5 minutes for up three  "doses, Disp: 100 tablet, Rfl: 1  •  rosuvastatin (CRESTOR) 20 MG tablet, TAKE 1 TABLET EVERY NIGHT, Disp: 90 tablet, Rfl: 3  •  Vitamin A 3 MG (93627 UT) capsule, Take 10,000 Units by mouth Daily., Disp: , Rfl:     The following portions of the patient's history were reviewed and updated as appropriate: allergies, current medications, past family history, past medical history, past social history, past surgical history and problem list.    ROS  Review of Systems   Constitution: Negative for chills, fever, fatigue, generalized weakness.   Cardiovascular: Negative for chest pain, dyspnea on exertion, leg swelling, palpitations, orthopnea, and syncope.   Respiratory: Negative for cough, shortness of breath, and wheezing.  HENT: Negative for ear pain, nosebleeds, and tinnitus.  Gastrointestinal: Negative for abdominal pain, constipation, diarrhea, nausea and vomiting.   Genitourinary: No urinary symptoms.  Musculoskeletal: Negative for muscle cramps.  Neurological: Negative for dizziness, headaches, loss of balance, numbness, and symptoms of stroke.  Psychiatric: Normal mental status.     All other systems reviewed and are negative.        Objective:     /78 (BP Location: Right arm, Patient Position: Sitting)   Pulse 65   Ht 177.8 cm (70\")   Wt 91.1 kg (200 lb 12.8 oz)   SpO2 98%   BMI 28.81 kg/m²      Physical Exam  Constitutional: Patient appears well-developed and well-nourished.   HENT: HEENT exam unremarkable.   Neck: Neck supple. No JVD present. No carotid bruits.   Cardiovascular: Normal rate, regular rhythm and normal heart sounds. No murmur heard.   2+ symmetric pulses.   Pulmonary/Chest: Breath sounds normal. Does not exhibit tenderness.   Abdominal: Abdomen benign.   Musculoskeletal: Does not exhibit edema.   Neurological: Neurological exam unremarkable.   Vitals reviewed.    Data Review:   Lab Results   Component Value Date    GLUCOSE 88 11/30/2021    BUN 12 11/30/2021    CREATININE 1.00 " 11/30/2021    EGFRIFNONA 75 11/30/2021    BCR 12.0 11/30/2021    K 4.1 11/30/2021    CO2 26.2 11/30/2021    CALCIUM 9.1 11/30/2021    ALBUMIN 4.50 11/04/2021    AST 21 11/04/2021    ALT 24 11/04/2021     Lab Results   Component Value Date    CHOL 101 11/04/2021    TRIG 70 11/04/2021    HDL 35 (L) 11/04/2021    LDL 51 11/04/2021      Lab Results   Component Value Date    WBC 5.58 11/30/2021    RBC 4.83 11/30/2021    HGB 14.6 11/30/2021    HCT 45.0 11/30/2021    MCV 93.2 11/30/2021     11/30/2021     Lab Results   Component Value Date    TSH 3.450 11/04/2021     Lab Results   Component Value Date    HGBA1C 5.00 04/29/2021          ECG 12 Lead    Date/Time: 10/14/2022 12:15 PM  Performed by: Zaira Kerns MD  Authorized by: Zaira Kerns MD   Comparison: compared with previous ECG from 4/30/2021  Similar to previous ECG  Rhythm: sinus rhythm  BPM: 65    Clinical impression: normal ECG               Assessment:      Diagnosis Plan   1. Coronary artery disease involving native coronary artery of native heart without angina pectoris  Stable without angina on current activity.  Will schedule Cardiolite stress test for ischemic evaluation in the setting of residual CAD.  Continue aspirin for antiplatelet therapy and rosuvastatin for dyslipidemia/CAD.  May take nitroglycerin as needed for chest pain.    2. Dyslipidemia  Well controlled. LDL 51 on 11/04/2021. Continue on rosuvastatin 20 mg nightly.         Plan:   Overall stable from cardiac standpoint, no current angina or heart failure symptoms.  Will schedule Cardiolite stress test for further assessment of ischemic burden in the setting of residual CAD.  Heart healthy diet and regular exercise recommended  Continue current medications.   FU in 6 MO, sooner as needed.  Thank you for allowing us to participate in the care of your patient.     Scribed for Zaira Kerns MD by Adriana Hobbs. 10/14/2022 12:12 EDT    I, Zaira Kerns MD, personally performed the services  described in this documentation as scribed by the above named individual in my presence, and it is both accurate and complete.  10/18/2022  06:28 EDT      Part of this note may be an electronic transcription/translation of spoken language to printed text using the Dragon Dictation System.

## 2022-10-13 DIAGNOSIS — E78.5 DYSLIPIDEMIA: ICD-10-CM

## 2022-10-13 DIAGNOSIS — I25.10 CORONARY ARTERY DISEASE INVOLVING NATIVE CORONARY ARTERY OF NATIVE HEART WITHOUT ANGINA PECTORIS: Primary | ICD-10-CM

## 2022-10-14 ENCOUNTER — OFFICE VISIT (OUTPATIENT)
Dept: CARDIOLOGY | Facility: CLINIC | Age: 62
End: 2022-10-14

## 2022-10-14 VITALS
SYSTOLIC BLOOD PRESSURE: 120 MMHG | HEIGHT: 70 IN | OXYGEN SATURATION: 98 % | WEIGHT: 200.8 LBS | HEART RATE: 65 BPM | BODY MASS INDEX: 28.75 KG/M2 | DIASTOLIC BLOOD PRESSURE: 78 MMHG

## 2022-10-14 DIAGNOSIS — E78.5 DYSLIPIDEMIA: ICD-10-CM

## 2022-10-14 DIAGNOSIS — I25.10 CORONARY ARTERY DISEASE INVOLVING NATIVE CORONARY ARTERY OF NATIVE HEART WITHOUT ANGINA PECTORIS: ICD-10-CM

## 2022-10-14 PROCEDURE — 99214 OFFICE O/P EST MOD 30 MIN: CPT | Performed by: INTERNAL MEDICINE

## 2022-10-14 PROCEDURE — 93000 ELECTROCARDIOGRAM COMPLETE: CPT | Performed by: INTERNAL MEDICINE

## 2022-10-19 ENCOUNTER — TELEPHONE (OUTPATIENT)
Dept: CARDIOLOGY | Facility: CLINIC | Age: 62
End: 2022-10-19

## 2022-10-19 ENCOUNTER — LAB (OUTPATIENT)
Dept: LAB | Facility: HOSPITAL | Age: 62
End: 2022-10-19

## 2022-10-19 DIAGNOSIS — I25.10 CORONARY ARTERY DISEASE INVOLVING NATIVE CORONARY ARTERY OF NATIVE HEART WITHOUT ANGINA PECTORIS: ICD-10-CM

## 2022-10-19 DIAGNOSIS — E78.5 DYSLIPIDEMIA: ICD-10-CM

## 2022-10-19 LAB
ALBUMIN SERPL-MCNC: 4.6 G/DL (ref 3.5–5.2)
ALBUMIN/GLOB SERPL: 1.8 G/DL
ALP SERPL-CCNC: 62 U/L (ref 39–117)
ALT SERPL W P-5'-P-CCNC: 29 U/L (ref 1–41)
ANION GAP SERPL CALCULATED.3IONS-SCNC: 11 MMOL/L (ref 5–15)
AST SERPL-CCNC: 30 U/L (ref 1–40)
BILIRUB SERPL-MCNC: 0.5 MG/DL (ref 0–1.2)
BUN SERPL-MCNC: 15 MG/DL (ref 8–23)
BUN/CREAT SERPL: 14.4 (ref 7–25)
CALCIUM SPEC-SCNC: 9 MG/DL (ref 8.6–10.5)
CHLORIDE SERPL-SCNC: 105 MMOL/L (ref 98–107)
CHOLEST SERPL-MCNC: 136 MG/DL (ref 0–200)
CO2 SERPL-SCNC: 25 MMOL/L (ref 22–29)
CREAT SERPL-MCNC: 1.04 MG/DL (ref 0.76–1.27)
DEPRECATED RDW RBC AUTO: 39.4 FL (ref 37–54)
EGFRCR SERPLBLD CKD-EPI 2021: 80.2 ML/MIN/1.73
ERYTHROCYTE [DISTWIDTH] IN BLOOD BY AUTOMATED COUNT: 11.8 % (ref 12.3–15.4)
GLOBULIN UR ELPH-MCNC: 2.6 GM/DL
GLUCOSE SERPL-MCNC: 92 MG/DL (ref 65–99)
HBA1C MFR BLD: 5.5 % (ref 4.8–5.6)
HCT VFR BLD AUTO: 43.7 % (ref 37.5–51)
HDLC SERPL-MCNC: 43 MG/DL (ref 40–60)
HGB BLD-MCNC: 15.2 G/DL (ref 13–17.7)
LDLC SERPL CALC-MCNC: 76 MG/DL (ref 0–100)
LDLC/HDLC SERPL: 1.73 {RATIO}
MCH RBC QN AUTO: 31.2 PG (ref 26.6–33)
MCHC RBC AUTO-ENTMCNC: 34.8 G/DL (ref 31.5–35.7)
MCV RBC AUTO: 89.7 FL (ref 79–97)
PLATELET # BLD AUTO: 210 10*3/MM3 (ref 140–450)
PMV BLD AUTO: 10 FL (ref 6–12)
POTASSIUM SERPL-SCNC: 4.3 MMOL/L (ref 3.5–5.2)
PROT SERPL-MCNC: 7.2 G/DL (ref 6–8.5)
RBC # BLD AUTO: 4.87 10*6/MM3 (ref 4.14–5.8)
SODIUM SERPL-SCNC: 141 MMOL/L (ref 136–145)
TRIGL SERPL-MCNC: 92 MG/DL (ref 0–150)
VLDLC SERPL-MCNC: 17 MG/DL (ref 5–40)
WBC NRBC COR # BLD: 5.34 10*3/MM3 (ref 3.4–10.8)

## 2022-10-19 PROCEDURE — 83036 HEMOGLOBIN GLYCOSYLATED A1C: CPT

## 2022-10-19 PROCEDURE — 36415 COLL VENOUS BLD VENIPUNCTURE: CPT

## 2022-10-19 PROCEDURE — 85027 COMPLETE CBC AUTOMATED: CPT

## 2022-10-19 PROCEDURE — 80053 COMPREHEN METABOLIC PANEL: CPT

## 2022-10-19 PROCEDURE — 80061 LIPID PANEL: CPT

## 2022-10-19 NOTE — TELEPHONE ENCOUNTER
"Pt LVM letting us know about an \"episode\" he had this past saturday. I called to inquire and LVM to pt. I let him know if it is chest pain or shortness of breath to call 911 to be immediately evaluated.   "

## 2022-10-20 NOTE — TELEPHONE ENCOUNTER
Agree with the information that you relayed.  If he has recurrent chest pain, he should sit down and rest and take a nitroglycerin.  If chest pain is not relieved with 3 nitroglycerin he is reported to emergency department.

## 2022-10-20 NOTE — TELEPHONE ENCOUNTER
Had Mexican food last Saturday night.Went for a walk 2 hours after eating.Had an episode of chest pain radiated to his neck and Rt arm. Relieved when he got home. Has stress test scheduled next month. Advised him to use his NTG SL tablets if he has chest pain again and to go to the ED if unrelieved after 3 NTG. Verbalized understanding.Please advise if any further instructions.

## 2022-11-15 ENCOUNTER — HOSPITAL ENCOUNTER (OUTPATIENT)
Dept: CARDIOLOGY | Facility: HOSPITAL | Age: 62
End: 2022-11-15

## 2022-12-15 ENCOUNTER — CLINICAL SUPPORT (OUTPATIENT)
Dept: FAMILY MEDICINE CLINIC | Facility: CLINIC | Age: 62
End: 2022-12-15
Payer: COMMERCIAL

## 2022-12-15 DIAGNOSIS — Z23 IMMUNIZATION DUE: Primary | ICD-10-CM

## 2022-12-15 PROCEDURE — 90686 IIV4 VACC NO PRSV 0.5 ML IM: CPT | Performed by: FAMILY MEDICINE

## 2022-12-15 PROCEDURE — 90471 IMMUNIZATION ADMIN: CPT | Performed by: FAMILY MEDICINE

## 2023-05-24 ENCOUNTER — HOSPITAL ENCOUNTER (EMERGENCY)
Facility: HOSPITAL | Age: 63
Discharge: HOME | End: 2023-05-24
Attending: EMERGENCY MEDICINE | Admitting: EMERGENCY MEDICINE
Payer: MEDICARE

## 2023-05-24 VITALS
RESPIRATION RATE: 18 BRPM | HEIGHT: 70 IN | SYSTOLIC BLOOD PRESSURE: 143 MMHG | HEART RATE: 96 BPM | DIASTOLIC BLOOD PRESSURE: 93 MMHG | TEMPERATURE: 97.8 F | WEIGHT: 200 LBS | OXYGEN SATURATION: 97 % | BODY MASS INDEX: 28.63 KG/M2

## 2023-05-24 DIAGNOSIS — R33.8 BENIGN PROSTATIC HYPERPLASIA WITH URINARY RETENTION: ICD-10-CM

## 2023-05-24 DIAGNOSIS — R33.9 URINARY RETENTION: Primary | ICD-10-CM

## 2023-05-24 DIAGNOSIS — N40.1 BENIGN PROSTATIC HYPERPLASIA WITH URINARY RETENTION: ICD-10-CM

## 2023-05-24 LAB
ALBUMIN SERPL-MCNC: 4.3 G/DL (ref 3.5–5.2)
ALBUMIN/GLOB SERPL: 1.7 G/DL
ALP SERPL-CCNC: 76 U/L (ref 39–117)
ALT SERPL W P-5'-P-CCNC: 36 U/L (ref 1–41)
ANION GAP SERPL CALCULATED.3IONS-SCNC: 11 MMOL/L (ref 5–15)
AST SERPL-CCNC: 28 U/L (ref 1–40)
BASOPHILS # BLD AUTO: 0.03 10*3/MM3 (ref 0–0.2)
BASOPHILS NFR BLD AUTO: 0.3 % (ref 0–1.5)
BILIRUB SERPL-MCNC: 0.4 MG/DL (ref 0–1.2)
BILIRUB UR QL STRIP: NEGATIVE
BUN SERPL-MCNC: 16 MG/DL (ref 8–23)
BUN/CREAT SERPL: 19.3 (ref 7–25)
CALCIUM SPEC-SCNC: 9.4 MG/DL (ref 8.6–10.5)
CHLORIDE SERPL-SCNC: 105 MMOL/L (ref 98–107)
CLARITY UR: CLEAR
CO2 SERPL-SCNC: 23 MMOL/L (ref 22–29)
COLOR UR: YELLOW
CREAT SERPL-MCNC: 0.83 MG/DL (ref 0.76–1.27)
DEPRECATED RDW RBC AUTO: 41.1 FL (ref 37–54)
EGFRCR SERPLBLD CKD-EPI 2021: 97.1 ML/MIN/1.73
EOSINOPHIL # BLD AUTO: 0 10*3/MM3 (ref 0–0.4)
EOSINOPHIL NFR BLD AUTO: 0 % (ref 0.3–6.2)
ERYTHROCYTE [DISTWIDTH] IN BLOOD BY AUTOMATED COUNT: 12 % (ref 12.3–15.4)
GLOBULIN UR ELPH-MCNC: 2.6 GM/DL
GLUCOSE SERPL-MCNC: 139 MG/DL (ref 65–99)
GLUCOSE UR STRIP-MCNC: NEGATIVE MG/DL
HCT VFR BLD AUTO: 44.1 % (ref 37.5–51)
HGB BLD-MCNC: 14.7 G/DL (ref 13–17.7)
HGB UR QL STRIP.AUTO: NEGATIVE
IMM GRANULOCYTES # BLD AUTO: 0.04 10*3/MM3 (ref 0–0.05)
IMM GRANULOCYTES NFR BLD AUTO: 0.4 % (ref 0–0.5)
KETONES UR QL STRIP: NEGATIVE
LEUKOCYTE ESTERASE UR QL STRIP.AUTO: NEGATIVE
LYMPHOCYTES # BLD AUTO: 0.68 10*3/MM3 (ref 0.7–3.1)
LYMPHOCYTES NFR BLD AUTO: 7.6 % (ref 19.6–45.3)
MCH RBC QN AUTO: 30.9 PG (ref 26.6–33)
MCHC RBC AUTO-ENTMCNC: 33.3 G/DL (ref 31.5–35.7)
MCV RBC AUTO: 92.6 FL (ref 79–97)
MONOCYTES # BLD AUTO: 0.36 10*3/MM3 (ref 0.1–0.9)
MONOCYTES NFR BLD AUTO: 4 % (ref 5–12)
NEUTROPHILS NFR BLD AUTO: 7.83 10*3/MM3 (ref 1.7–7)
NEUTROPHILS NFR BLD AUTO: 87.7 % (ref 42.7–76)
NITRITE UR QL STRIP: NEGATIVE
NRBC BLD AUTO-RTO: 0 /100 WBC (ref 0–0.2)
PH UR STRIP.AUTO: 6 [PH] (ref 5–8)
PLATELET # BLD AUTO: 218 10*3/MM3 (ref 140–450)
PMV BLD AUTO: 9.7 FL (ref 6–12)
POTASSIUM SERPL-SCNC: 4.2 MMOL/L (ref 3.5–5.2)
PROT SERPL-MCNC: 6.9 G/DL (ref 6–8.5)
PROT UR QL STRIP: NEGATIVE
PSA SERPL-MCNC: 22.3 NG/ML (ref 0–4)
RBC # BLD AUTO: 4.76 10*6/MM3 (ref 4.14–5.8)
SODIUM SERPL-SCNC: 139 MMOL/L (ref 136–145)
SP GR UR STRIP: 1.02 (ref 1–1.03)
UROBILINOGEN UR QL STRIP: NORMAL
WBC NRBC COR # BLD: 8.94 10*3/MM3 (ref 3.4–10.8)

## 2023-05-24 PROCEDURE — 80053 COMPREHEN METABOLIC PANEL: CPT | Performed by: EMERGENCY MEDICINE

## 2023-05-24 PROCEDURE — G0103 PSA SCREENING: HCPCS | Performed by: EMERGENCY MEDICINE

## 2023-05-24 PROCEDURE — 99283 EMERGENCY DEPT VISIT LOW MDM: CPT

## 2023-05-24 PROCEDURE — 36415 COLL VENOUS BLD VENIPUNCTURE: CPT

## 2023-05-24 PROCEDURE — 81003 URINALYSIS AUTO W/O SCOPE: CPT | Performed by: EMERGENCY MEDICINE

## 2023-05-24 PROCEDURE — 85025 COMPLETE CBC W/AUTO DIFF WBC: CPT | Performed by: EMERGENCY MEDICINE

## 2023-05-24 PROCEDURE — 51702 INSERT TEMP BLADDER CATH: CPT

## 2023-05-24 RX ORDER — LIDOCAINE HYDROCHLORIDE 20 MG/ML
JELLY TOPICAL AS NEEDED
Status: DISCONTINUED | OUTPATIENT
Start: 2023-05-24 | End: 2023-05-24 | Stop reason: HOSPADM

## 2023-05-24 RX ORDER — TAMSULOSIN HYDROCHLORIDE 0.4 MG/1
1 CAPSULE ORAL DAILY
Qty: 7 CAPSULE | Refills: 0 | Status: SHIPPED | OUTPATIENT
Start: 2023-05-24

## 2023-05-24 RX ADMIN — LIDOCAINE HYDROCHLORIDE: 20 JELLY TOPICAL at 14:12

## 2023-05-24 NOTE — ED PROVIDER NOTES
Subjective   History of Present Illness  Patient is a pleasant 63-year-old male who presents with pelvic fullness and urinary retention.  He has a history of BPH and is followed by Dr. Pan.  He states that yesterday evening around 11 PM he urinated normally.  He woke up around 1:30 AM which is not unusual for him and at that time was unable to urinate.  He got up approximately every hour after that and has been unable to urinate since that time.  Throughout the mornings had progressively worsening fullness consistent with urinary retention.  He had a similar episode after a cardiac cath in the distant past but otherwise denies any similar episodes in the past.  Denies fever, chills, chest pain, shortness of breath, abdominal pain, vomiting, diarrhea, or other acute complaint.          Review of Systems   All other systems reviewed and are negative.      Past Medical History:   Diagnosis Date   • Enlarged prostate        No Known Allergies    Past Surgical History:   Procedure Laterality Date   • CARDIAC CATHETERIZATION N/A 4/29/2021    Procedure: Left Heart Cath;  Surgeon: Zaira Kerns MD;  [facility];  Service: Cardiovascular;  Laterality: N/A;   • SHOULDER SURGERY         Family History   Problem Relation Age of Onset   • Leukemia Mother    • Aortic stenosis Father    • Rheum arthritis Sister    • Cancer Sister    • No Known Problems Brother    • No Known Problems Maternal Grandmother    • No Known Problems Maternal Grandfather    • No Known Problems Paternal Grandmother    • No Known Problems Paternal Grandfather    • No Known Problems Sister        Social History     Socioeconomic History   • Marital status:    Tobacco Use   • Smoking status: Never   • Smokeless tobacco: Never   Vaping Use   • Vaping Use: Never used   Substance and Sexual Activity   • Alcohol use: No   • Drug use: No   • Sexual activity: Defer           Objective   Physical Exam  Vitals and nursing note  reviewed.   Constitutional:       General: He is not in acute distress.     Appearance: Normal appearance.   HENT:      Head: Normocephalic and atraumatic.      Nose: Nose normal.   Eyes:      Extraocular Movements: Extraocular movements intact.      Conjunctiva/sclera: Conjunctivae normal.      Pupils: Pupils are equal, round, and reactive to light.   Neck:      Thyroid: No thyromegaly.   Cardiovascular:      Rate and Rhythm: Normal rate and regular rhythm.      Heart sounds: Normal heart sounds. No murmur heard.    No friction rub. No gallop.   Pulmonary:      Effort: Pulmonary effort is normal. No respiratory distress.      Breath sounds: Normal breath sounds.   Abdominal:      General: Bowel sounds are normal.      Palpations: Abdomen is soft.      Tenderness: There is abdominal tenderness (Suprapubic).   Musculoskeletal:         General: Normal range of motion.      Cervical back: Normal range of motion.   Lymphadenopathy:      Cervical: No cervical adenopathy.   Skin:     General: Skin is warm and dry.      Capillary Refill: Capillary refill takes less than 2 seconds.   Neurological:      Mental Status: He is alert and oriented to person, place, and time.   Psychiatric:         Behavior: Behavior normal.         Thought Content: Thought content normal.         Procedures           ED Course      Recent Results (from the past 24 hour(s))   Comprehensive Metabolic Panel    Collection Time: 05/24/23  3:11 PM    Specimen: Blood   Result Value Ref Range    Glucose 139 (H) 65 - 99 mg/dL    BUN 16 8 - 23 mg/dL    Creatinine 0.83 0.76 - 1.27 mg/dL    Sodium 139 136 - 145 mmol/L    Potassium 4.2 3.5 - 5.2 mmol/L    Chloride 105 98 - 107 mmol/L    CO2 23.0 22.0 - 29.0 mmol/L    Calcium 9.4 8.6 - 10.5 mg/dL    Total Protein 6.9 6.0 - 8.5 g/dL    Albumin 4.3 3.5 - 5.2 g/dL    ALT (SGPT) 36 1 - 41 U/L    AST (SGOT) 28 1 - 40 U/L    Alkaline Phosphatase 76 39 - 117 U/L    Total Bilirubin 0.4 0.0 - 1.2 mg/dL    Globulin 2.6  gm/dL    A/G Ratio 1.7 g/dL    BUN/Creatinine Ratio 19.3 7.0 - 25.0    Anion Gap 11.0 5.0 - 15.0 mmol/L    eGFR 97.1 >60.0 mL/min/1.73   CBC Auto Differential    Collection Time: 05/24/23  3:11 PM    Specimen: Blood   Result Value Ref Range    WBC 8.94 3.40 - 10.80 10*3/mm3    RBC 4.76 4.14 - 5.80 10*6/mm3    Hemoglobin 14.7 13.0 - 17.7 g/dL    Hematocrit 44.1 37.5 - 51.0 %    MCV 92.6 79.0 - 97.0 fL    MCH 30.9 26.6 - 33.0 pg    MCHC 33.3 31.5 - 35.7 g/dL    RDW 12.0 (L) 12.3 - 15.4 %    RDW-SD 41.1 37.0 - 54.0 fl    MPV 9.7 6.0 - 12.0 fL    Platelets 218 140 - 450 10*3/mm3    Neutrophil % 87.7 (H) 42.7 - 76.0 %    Lymphocyte % 7.6 (L) 19.6 - 45.3 %    Monocyte % 4.0 (L) 5.0 - 12.0 %    Eosinophil % 0.0 (L) 0.3 - 6.2 %    Basophil % 0.3 0.0 - 1.5 %    Immature Grans % 0.4 0.0 - 0.5 %    Neutrophils, Absolute 7.83 (H) 1.70 - 7.00 10*3/mm3    Lymphocytes, Absolute 0.68 (L) 0.70 - 3.10 10*3/mm3    Monocytes, Absolute 0.36 0.10 - 0.90 10*3/mm3    Eosinophils, Absolute 0.00 0.00 - 0.40 10*3/mm3    Basophils, Absolute 0.03 0.00 - 0.20 10*3/mm3    Immature Grans, Absolute 0.04 0.00 - 0.05 10*3/mm3    nRBC 0.0 0.0 - 0.2 /100 WBC   PSA Screen    Collection Time: 05/24/23  3:11 PM    Specimen: Blood   Result Value Ref Range    PSA 22.300 (H) 0.000 - 4.000 ng/mL   Urinalysis With Culture If Indicated - Straight Cath    Collection Time: 05/24/23  3:24 PM    Specimen: Straight Cath; Urine   Result Value Ref Range    Color, UA Yellow Yellow, Straw    Appearance, UA Clear Clear    pH, UA 6.0 5.0 - 8.0    Specific Gravity, UA 1.017 1.001 - 1.030    Glucose, UA Negative Negative    Ketones, UA Negative Negative    Bilirubin, UA Negative Negative    Blood, UA Negative Negative    Protein, UA Negative Negative    Leuk Esterase, UA Negative Negative    Nitrite, UA Negative Negative    Urobilinogen, UA 0.2 E.U./dL 0.2 - 1.0 E.U./dL     Note: In addition to lab results from this visit, the labs listed above may include labs taken at  "another facility or during a different encounter within the last 24 hours. Please correlate lab times with ED admission and discharge times for further clarification of the services performed during this visit.    No orders to display     Vitals:    05/24/23 1337   BP: 143/93   BP Location: Left arm   Patient Position: Sitting   Pulse: 96   Resp: 18   Temp: 97.8 °F (36.6 °C)   TempSrc: Oral   SpO2: 97%   Weight: 90.7 kg (200 lb)   Height: 177.8 cm (70\")     Medications - No data to display  ECG/EMG Results (last 24 hours)     ** No results found for the last 24 hours. **        No orders to display                                            Medical Decision Making  Patient felt complete relief of pain with the Rizzo catheter placement.  Nursing staff will teach patient to use a leg bag and home care for his Rizzo catheter.  He has reliable outpatient follow-up with Dr. Gonzales.  He understands have a low threshold to return to the emergency department should symptoms worsen or other concerns arise.    Benign prostatic hyperplasia with urinary retention: complicated acute illness or injury  Urinary retention: complicated acute illness or injury  Amount and/or Complexity of Data Reviewed  External Data Reviewed: notes.  Labs: ordered. Decision-making details documented in ED Course.      Risk  Prescription drug management.          Final diagnoses:   Urinary retention   Benign prostatic hyperplasia with urinary retention       ED Disposition  ED Disposition     ED Disposition   Discharge    Condition   Stable    Comment   --           DISCHARGE    Patient discharged in stable condition.    Reviewed implications of results, diagnosis, meds, responsibility to follow up, warning signs and symptoms of possible worsening, potential complications and reasons to return to ER.    Patient/Family voiced understanding of above instructions.    Discussed plan for discharge, as there is no emergent indication for admission.  " Pt/family is agreeable and understands need for follow up and possible repeat testing.  Pt/family is aware that discharge does not mean that nothing is wrong but that it indicates no emergency is currently present that requires admission and they must continue care with follow-up as given below or with a physician of their choice.     FOLLOW-UP  Juan A Gonzales Jr., MD  [address] [facility]  [address]  [place] [id number]  [phone]    Schedule an appointment as soon as possible for a visit       Ronny Avilez MD  [address] [facility] [address]  [facility] [address]  [phone]    Schedule an appointment as soon as possible for a visit       [facility] Emergency Department  [address] [facility] [address] [phone]    If symptoms worsen         Medication List      New Prescriptions    tamsulosin 0.4 MG capsule 24 hr capsule  Commonly known as: FLOMAX  Take 1 capsule by mouth Daily.           Where to Get Your Medications      These medications were sent to [facility] - [place], [place] - [address] [place] [address] - [phone] [facility] [phone] [address] [address]    Phone: [phone]   · tamsulosin 0.4 MG capsule 24 hr capsule            Eloy Canas DO  05/25/23 0424

## 2023-05-24 NOTE — DISCHARGE INSTRUCTIONS
You had a Rizzo catheter placed during your emergency department stay today.  Keep this catheter in place until you follow-up with Dr. Gonzales next week.  Return to the emergency department if symptoms worsen or other concerns arise.

## 2023-05-29 NOTE — PROGRESS NOTES
Attended Phase II Cardiac Rehab. No medication or health history changes reported. See [facility] for details.  
Patent

## 2023-05-30 ENCOUNTER — HOSPITAL ENCOUNTER (EMERGENCY)
Facility: HOSPITAL | Age: 63
Discharge: HOME | End: 2023-05-30
Attending: EMERGENCY MEDICINE | Admitting: EMERGENCY MEDICINE

## 2023-05-30 VITALS
HEART RATE: 81 BPM | HEIGHT: 70 IN | BODY MASS INDEX: 28.35 KG/M2 | OXYGEN SATURATION: 98 % | DIASTOLIC BLOOD PRESSURE: 81 MMHG | SYSTOLIC BLOOD PRESSURE: 121 MMHG | RESPIRATION RATE: 18 BRPM | WEIGHT: 198 LBS | TEMPERATURE: 98.6 F

## 2023-05-30 DIAGNOSIS — Z87.898 HISTORY OF URINARY RETENTION: ICD-10-CM

## 2023-05-30 DIAGNOSIS — N39.0 ACUTE UTI: Primary | ICD-10-CM

## 2023-05-30 LAB
BACTERIA UR QL AUTO: ABNORMAL /HPF
BILIRUB UR QL STRIP: NEGATIVE
CLARITY UR: ABNORMAL
COLOR UR: YELLOW
GLUCOSE UR STRIP-MCNC: NEGATIVE MG/DL
HGB UR QL STRIP.AUTO: ABNORMAL
HYALINE CASTS UR QL AUTO: ABNORMAL /LPF
KETONES UR QL STRIP: ABNORMAL
LEUKOCYTE ESTERASE UR QL STRIP.AUTO: ABNORMAL
NITRITE UR QL STRIP: POSITIVE
PH UR STRIP.AUTO: 8.5 [PH] (ref 5–8)
PROT UR QL STRIP: ABNORMAL
RBC # UR STRIP: ABNORMAL /HPF
REF LAB TEST METHOD: ABNORMAL
SP GR UR STRIP: 1.02 (ref 1–1.03)
SQUAMOUS #/AREA URNS HPF: ABNORMAL /HPF
UROBILINOGEN UR QL STRIP: ABNORMAL
WBC # UR STRIP: ABNORMAL /HPF

## 2023-05-30 PROCEDURE — 87077 CULTURE AEROBIC IDENTIFY: CPT | Performed by: PHYSICIAN ASSISTANT

## 2023-05-30 PROCEDURE — 87086 URINE CULTURE/COLONY COUNT: CPT | Performed by: PHYSICIAN ASSISTANT

## 2023-05-30 PROCEDURE — 99283 EMERGENCY DEPT VISIT LOW MDM: CPT

## 2023-05-30 PROCEDURE — 36481 INSERTION OF CATHETER VEIN: CPT

## 2023-05-30 PROCEDURE — 81001 URINALYSIS AUTO W/SCOPE: CPT | Performed by: PHYSICIAN ASSISTANT

## 2023-05-30 PROCEDURE — 87186 SC STD MICRODIL/AGAR DIL: CPT | Performed by: PHYSICIAN ASSISTANT

## 2023-05-30 PROCEDURE — 51702 INSERT TEMP BLADDER CATH: CPT

## 2023-05-30 RX ORDER — CEFUROXIME AXETIL 500 MG/1
500 TABLET ORAL 2 TIMES DAILY
Qty: 20 TABLET | Refills: 0 | Status: SHIPPED | OUTPATIENT
Start: 2023-05-30

## 2023-05-30 NOTE — ED PROVIDER NOTES
Subjective   History of Present Illness  63-year-old male presents emergency department today with burning at the meatus of his urethra.  Patient reports that he had have a catheter placed on Wednesday due to urinary retention.  Patient reports he had no bleeding or leakage around the tube but has been having discomfort at the tip of his penis.  Patient reports he had some blood in his urine and the urine does appear to be darker.  He said no fevers no chills no nausea or vomiting.  Denies any abdominal pain associated with this    History provided by:  Patient   used: No    Dysuria  Location:  Dysuria  Severity:  Mild  Onset quality:  Gradual  Timing:  Constant  Progression:  Worsening  Chronicity:  New  Context:  Rizzo placed last Wednesday sees urology tomorrow.  Relieved by:  Nothing  Worsened by:  Nothing  Associated symptoms: no abdominal pain, no cough, no diarrhea, no ear pain, no fever, no rash, no rhinorrhea, no vomiting and no wheezing        Review of Systems   Constitutional: Negative for chills and fever.   HENT: Negative for ear pain and rhinorrhea.    Respiratory: Negative for cough and wheezing.    Gastrointestinal: Negative for abdominal pain, diarrhea and vomiting.   Genitourinary: Positive for dysuria.   Musculoskeletal: Negative for back pain and neck pain.   Skin: Negative for pallor and rash.   Psychiatric/Behavioral: Negative.    All other systems reviewed and are negative.      Past Medical History:   Diagnosis Date   • Enlarged prostate        No Known Allergies    Past Surgical History:   Procedure Laterality Date   • CARDIAC CATHETERIZATION N/A 4/29/2021    Procedure: Left Heart Cath;  Surgeon: Zaira Kerns MD;  Location: [facility] CATH INVASIVE LOCATION;  Service: Cardiovascular;  Laterality: N/A;   • SHOULDER SURGERY         Family History   Problem Relation Age of Onset   • Leukemia Mother    • Aortic stenosis Father    • Rheum arthritis Sister    • Cancer Sister    •  No Known Problems Brother    • No Known Problems Maternal Grandmother    • No Known Problems Maternal Grandfather    • No Known Problems Paternal Grandmother    • No Known Problems Paternal Grandfather    • No Known Problems Sister        Social History     Socioeconomic History   • Marital status:    Tobacco Use   • Smoking status: Never   • Smokeless tobacco: Never   Vaping Use   • Vaping Use: Never used   Substance and Sexual Activity   • Alcohol use: No   • Drug use: No   • Sexual activity: Defer           Objective   Physical Exam  Vitals and nursing note reviewed.   Constitutional:       Appearance: He is well-developed.   HENT:      Head: Normocephalic and atraumatic.      Right Ear: External ear normal.      Left Ear: External ear normal.      Nose: Nose normal.   Eyes:      General: No scleral icterus.     Conjunctiva/sclera: Conjunctivae normal.      Pupils: Pupils are equal, round, and reactive to light.   Neck:      Thyroid: No thyromegaly.   Cardiovascular:      Rate and Rhythm: Normal rate and regular rhythm.      Heart sounds: Normal heart sounds.   Pulmonary:      Effort: Pulmonary effort is normal. No respiratory distress.   Genitourinary:     Comments: Rizzo catheter in place meatus does not appear to be bleeding torn or irritated.  Musculoskeletal:         General: Normal range of motion.      Cervical back: Normal range of motion.   Lymphadenopathy:      Cervical: No cervical adenopathy.   Skin:     General: Skin is warm and dry.   Neurological:      Mental Status: He is alert and oriented to person, place, and time.      Cranial Nerves: No cranial nerve deficit.      Coordination: Coordination normal.      Deep Tendon Reflexes: Reflexes are normal and symmetric. Reflexes normal.   Psychiatric:         Behavior: Behavior normal.         Thought Content: Thought content normal.         Judgment: Judgment normal.         Procedures           ED Course           No results found for this or  "any previous visit (from the past 24 hour(s)).  Note: In addition to lab results from this visit, the labs listed above may include labs taken at another facility or during a different encounter within the last 24 hours. Please correlate lab times with ED admission and discharge times for further clarification of the services performed during this visit.    No orders to display     Vitals:    05/30/23 1022   BP: 121/81   BP Location: Left arm   Patient Position: Sitting   Pulse: 81   Resp: 18   Temp: 98.6 °F (37 °C)   TempSrc: Oral   SpO2: 98%   Weight: 89.8 kg (198 lb)   Height: 177.8 cm (70\")     Medications - No data to display  ECG/EMG Results (last 24 hours)     ** No results found for the last 24 hours. **        No orders to display                                         Medical Decision Making  Patient had a have a Rizzo catheter placed on Wednesday.  Has been having blood in the urine in his bag.  He has had discomfort at the tip of the meatus.  Sees urology tomorrow.  Urinalysis revealed what appears to be acute UTI urine culture pending.  We will start him on Ceftin twice daily he sees urology tomorrow to have the Rizzo removed.    Acute UTI: acute illness or injury  History of urinary retention: acute illness or injury  Amount and/or Complexity of Data Reviewed  Labs: ordered. Decision-making details documented in ED Course.      Risk  Prescription drug management.          Final diagnoses:   Acute UTI   History of urinary retention       ED Disposition  ED Disposition     ED Disposition   Discharge    Condition   Stable    Comment   --             Juan A Gonzales Jr., MD  [address] [place] [address]  [phone]      Tomorrow schedule         Medication List      New Prescriptions    cefuroxime 500 MG tablet  Commonly known as: CEFTIN  Take 1 tablet by mouth 2 (Two) Times a Day.           Where to Get Your Medications      These medications were sent to [facility]/pharmacy #[id number] " - [place] - [address] - [phone] PH - [phone] FX  [address], [facility] [address]    Hours: 24-hours Phone: [phone]   · cefuroxime 500 MG tablet          Cosme Cornejo PA  06/01/23 1951

## 2023-06-01 LAB — BACTERIA SPEC AEROBE CULT: ABNORMAL

## 2023-06-05 ENCOUNTER — HOSPITAL ENCOUNTER (EMERGENCY)
Facility: HOSPITAL | Age: 63
Discharge: HOME | End: 2023-06-05
Attending: EMERGENCY MEDICINE | Admitting: EMERGENCY MEDICINE
Payer: MEDICARE

## 2023-06-05 VITALS
BODY MASS INDEX: 28.63 KG/M2 | HEART RATE: 82 BPM | OXYGEN SATURATION: 99 % | SYSTOLIC BLOOD PRESSURE: 121 MMHG | RESPIRATION RATE: 16 BRPM | DIASTOLIC BLOOD PRESSURE: 86 MMHG | WEIGHT: 200 LBS | TEMPERATURE: 98.6 F | HEIGHT: 70 IN

## 2023-06-05 DIAGNOSIS — R33.8 ACUTE URINARY RETENTION: Primary | ICD-10-CM

## 2023-06-05 LAB
BILIRUB UR QL STRIP: NEGATIVE
CLARITY UR: CLEAR
COLOR UR: YELLOW
GLUCOSE UR STRIP-MCNC: NEGATIVE MG/DL
HGB UR QL STRIP.AUTO: NEGATIVE
KETONES UR QL STRIP: NEGATIVE
LEUKOCYTE ESTERASE UR QL STRIP.AUTO: NEGATIVE
NITRITE UR QL STRIP: NEGATIVE
PH UR STRIP.AUTO: 6 [PH] (ref 5–8)
PROT UR QL STRIP: ABNORMAL
SP GR UR STRIP: 1.02 (ref 1–1.03)
UROBILINOGEN UR QL STRIP: ABNORMAL

## 2023-06-05 PROCEDURE — 51702 INSERT TEMP BLADDER CATH: CPT

## 2023-06-05 PROCEDURE — 99283 EMERGENCY DEPT VISIT LOW MDM: CPT

## 2023-06-05 PROCEDURE — 81003 URINALYSIS AUTO W/O SCOPE: CPT | Performed by: EMERGENCY MEDICINE

## 2023-06-05 PROCEDURE — 51798 US URINE CAPACITY MEASURE: CPT

## 2023-06-05 RX ORDER — ACETAMINOPHEN 500 MG
1000 TABLET ORAL ONCE
Status: COMPLETED | OUTPATIENT
Start: 2023-06-05 | End: 2023-06-05

## 2023-06-05 RX ORDER — ALFUZOSIN HYDROCHLORIDE 10 MG/1
10 TABLET, EXTENDED RELEASE ORAL DAILY
COMMUNITY

## 2023-06-05 RX ORDER — LIDOCAINE HYDROCHLORIDE 20 MG/ML
JELLY TOPICAL AS NEEDED
Status: DISCONTINUED | OUTPATIENT
Start: 2023-06-05 | End: 2023-06-05 | Stop reason: HOSPADM

## 2023-06-05 RX ORDER — PHENAZOPYRIDINE HYDROCHLORIDE 100 MG/1
200 TABLET, FILM COATED ORAL ONCE
Status: COMPLETED | OUTPATIENT
Start: 2023-06-05 | End: 2023-06-05

## 2023-06-05 RX ORDER — PHENAZOPYRIDINE HYDROCHLORIDE 200 MG/1
200 TABLET, FILM COATED ORAL 3 TIMES DAILY PRN
Qty: 9 TABLET | Refills: 0 | Status: SHIPPED | OUTPATIENT
Start: 2023-06-05

## 2023-06-05 RX ADMIN — LIDOCAINE HYDROCHLORIDE: 20 JELLY TOPICAL at 07:07

## 2023-06-05 RX ADMIN — ACETAMINOPHEN 1000 MG: 500 TABLET ORAL at 09:05

## 2023-06-05 RX ADMIN — PHENAZOPYRIDINE 200 MG: 100 TABLET ORAL at 09:06

## 2023-06-05 NOTE — ED PROVIDER NOTES
Subjective   History of Present Illness  Mr. Guadalupe presents with urinary retention.  It has been present since last night.  He has long history of prostate issues.  Was seen here a week ago and placed on Ceftin and a Rizzo catheter was passed.  He reports that the catheter was very painful and it was removed 5 days ago by Dr. Gonzales.    Review of Systems    Past Medical History:   Diagnosis Date    Enlarged prostate        No Known Allergies    Past Surgical History:   Procedure Laterality Date    CARDIAC CATHETERIZATION N/A 4/29/2021    Procedure: Left Heart Cath;  Surgeon: Zaira Kerns MD;  Location: [facility] CATH INVASIVE LOCATION;  Service: Cardiovascular;  Laterality: N/A;    SHOULDER SURGERY         Family History   Problem Relation Age of Onset    Leukemia Mother     Aortic stenosis Father     Rheum arthritis Sister     Cancer Sister     No Known Problems Brother     No Known Problems Maternal Grandmother     No Known Problems Maternal Grandfather     No Known Problems Paternal Grandmother     No Known Problems Paternal Grandfather     No Known Problems Sister        Social History     Socioeconomic History    Marital status:    Tobacco Use    Smoking status: Never    Smokeless tobacco: Never   Vaping Use    Vaping Use: Never used   Substance and Sexual Activity    Alcohol use: No    Drug use: No    Sexual activity: Defer           Objective   Physical Exam  Vitals and nursing note reviewed.   Constitutional:       General: He is not in acute distress.     Appearance: Normal appearance.   HENT:      Head: Normocephalic and atraumatic.      Nose: Nose normal. No congestion or rhinorrhea.   Eyes:      General: No scleral icterus.     Conjunctiva/sclera: Conjunctivae normal.   Neck:      Comments: No JVD   Cardiovascular:      Rate and Rhythm: Normal rate and regular rhythm.      Heart sounds: No murmur heard.    No friction rub.   Pulmonary:      Effort: Pulmonary effort is normal.      Breath sounds:  Normal breath sounds. No wheezing or rales.   Abdominal:      General: Bowel sounds are normal.      Palpations: Abdomen is soft.      Tenderness: There is abdominal tenderness. There is no guarding or rebound.      Comments: He is tender to palpate over the bladder, I do palpate a bladder fundus   Musculoskeletal:         General: No tenderness.      Cervical back: Normal range of motion and neck supple.      Right lower leg: No edema.      Left lower leg: No edema.   Skin:     General: Skin is warm and dry.      Coloration: Skin is not pale.      Findings: No erythema.   Neurological:      General: No focal deficit present.      Mental Status: He is alert and oriented to person, place, and time.      Motor: No weakness.      Coordination: Coordination normal.   Psychiatric:         Mood and Affect: Mood normal.         Behavior: Behavior normal.         Thought Content: Thought content normal.       Procedures           ED Course  ED Course as of 06/05/23 1643   Mon Jun 05, 2023   0741 On repeat exam I have interpreted his urinalysis as being negative for infection.  He complains of burning at the catheter site.  He tells me this was a problem he had before.  Will treat with Pyridium. [DT]      ED Course User Index  [DT] Hitesh López MD                                           Medical Decision Making  Initial differential diagnosis included urinary retention.  We obtained a bladder scan which confirmed this.  I reviewed records indicating history of prior prostate enlargement.  He sees a urologist who monitors his PSA.  We passed a Rizzo catheter and I reexamined him after that and he felt better but continued to have discomfort.  I prescribed medication to treat that.    Problems Addressed:  Acute urinary retention: complicated acute illness or injury    Amount and/or Complexity of Data Reviewed  External Data Reviewed: notes.  Labs: ordered. Decision-making details documented in ED Course.    Risk  OTC  drugs.  Prescription drug management.        Final diagnoses:   Acute urinary retention       ED Disposition  ED Disposition       ED Disposition   Discharge    Condition   Stable    Comment   --               Ronny Avilez MD  [address] [facility]  [address]  [place] [id number]  [phone]          Juan A Gonzales Jr., MD  [address] [facility] [address]  [place] [id number]  [phone]               Medication List        New Prescriptions      magnesium citrate solution  Take 296 mL by mouth 1 (One) Time for 1 dose.     phenazopyridine 200 MG tablet  Commonly known as: PYRIDIUM  Take 1 tablet by mouth 3 (Three) Times a Day As Needed for Bladder Spasms (As needed for urinary frequency and discomfort).               Where to Get Your Medications        These medications were sent to [facility]/pharmacy #[id number] - [place], [place] - [address] - [phone]  - [phone]   [address] , [facility] [address]      Hours: 24-hours Phone: [phone]   magnesium citrate solution  phenazopyridine 200 MG tablet            Hitesh López MD  06/05/23 1539       Hitesh López MD  06/05/23 3357

## 2023-07-11 ENCOUNTER — TELEPHONE (OUTPATIENT)
Dept: CARDIOLOGY | Facility: CLINIC | Age: 63
End: 2023-07-11

## 2023-07-11 NOTE — TELEPHONE ENCOUNTER
Caller: Jay Guadalupe    Relationship to patient: Self    Best call back number: [phone]    Chief complaint: PATIENT NEEDS TO BE RESCHEDULED FOR F/U    Type of visit: F/U    Requested date: ASAP    If rescheduling, when is the original appointment: 5.19.23    Additional notes: PATIENT WAS CONFUSED WHEN HE SEEN NEW APPT OF 8.25.23 AND CANCELLED IT VIA [employer]. ORIGINAL APPT OF 5.19.23 WAS CANCELLED DUE TO TEST RESULTS NOT BEING BACK IN TIME, PLEASE REACH BACK OUT TO PATIENT TO RESCHEDULE, THANK YOU.

## 2023-08-08 ENCOUNTER — OFFICE VISIT (OUTPATIENT)
Dept: FAMILY MEDICINE CLINIC | Facility: CLINIC | Age: 63
End: 2023-08-08
Payer: MEDICARE

## 2023-08-08 VITALS
BODY MASS INDEX: 28.18 KG/M2 | OXYGEN SATURATION: 97 % | TEMPERATURE: 98.7 F | HEIGHT: 70 IN | WEIGHT: 196.8 LBS | SYSTOLIC BLOOD PRESSURE: 111 MMHG | HEART RATE: 62 BPM | DIASTOLIC BLOOD PRESSURE: 68 MMHG

## 2023-08-08 DIAGNOSIS — R97.20 HIGH PROSTATE SPECIFIC ANTIGEN (PSA): ICD-10-CM

## 2023-08-08 DIAGNOSIS — I25.10 CORONARY ARTERY DISEASE INVOLVING NATIVE CORONARY ARTERY OF NATIVE HEART WITHOUT ANGINA PECTORIS: ICD-10-CM

## 2023-08-08 DIAGNOSIS — M89.9 DISORDER OF BONE, UNSPECIFIED: ICD-10-CM

## 2023-08-08 DIAGNOSIS — R25.2 LEG CRAMPING: ICD-10-CM

## 2023-08-08 DIAGNOSIS — Z12.11 SCREENING FOR COLON CANCER: ICD-10-CM

## 2023-08-08 DIAGNOSIS — Z00.00 ENCOUNTER FOR ANNUAL PHYSICAL EXAM: Primary | ICD-10-CM

## 2023-08-08 PROBLEM — R33.9 RETENTION OF URINE: Status: ACTIVE | Noted: 2023-06-04

## 2023-08-08 RX ORDER — MELOXICAM 7.5 MG/1
TABLET ORAL
COMMUNITY
Start: 2023-08-01

## 2023-08-08 NOTE — ASSESSMENT & PLAN NOTE
Annual wellness exam completed today. Health Maintenance including immunizations was updated and reflected in the chart. Yearly screening labs were ordered.     Further recommendations to be given once lab data received.     Health advice: healthy food choices with fresh fruits and vegetables, maintain sleep pattern at least 8 hours, avoid texting and distracted driving practices; wear safety belt, engage in regular exercise, maintain healthy weight, use safe sex practices, avoid alcohol and illicit drugs. Maintain immunizations that are up to date. Maintain health maintenance:PSA, Colonoscopy, etc.  Follow up with PCP if struggling with depression or anxiety. Keep regular dental and eye exams. Brush and floss teeth daily.     F/U annually and prn.

## 2023-08-08 NOTE — ASSESSMENT & PLAN NOTE
Coronary artery disease is  stable  .  Continue current treatment regimen.  Dietary sodium restriction.  Regular aerobic exercise.  Cardiac status will be reassessed  at follow up and encouraged follow up with Cardiology  .

## 2023-08-08 NOTE — PROGRESS NOTES
The ABCs of the Annual Wellness Visit  Subsequent Medicare Wellness Visit    Subjective    Jay Guadalupe is a 63 y.o. male who presents for a Subsequent Medicare Wellness Visit.    The following portions of the patient's history were reviewed and   updated as appropriate: allergies, current medications, past family history, past medical history, past social history, past surgical history, and problem list.    Compared to one year ago, the patient feels his physical   health is the same.    Compared to one year ago, the patient feels his mental   health is the same.    Recent Hospitalizations:  He was not admitted to the hospital during the last year.       Current Medical Providers:  Patient Care Team:  Carmel Simons DO as PCP - General (Family Medicine)  Aileen Powers PA-C as Physician Assistant (Physician Assistant)    Outpatient Medications Prior to Visit   Medication Sig Dispense Refill    alfuzosin (UROXATRAL) 10 MG 24 hr tablet Take 1 tablet by mouth Daily.      aspirin 81 MG chewable tablet Chew 1 tablet Daily. 100 tablet 3    finasteride (PROSCAR) 5 MG tablet Daily.      meloxicam (MOBIC) 7.5 MG tablet       nitroglycerin (NITROSTAT) 0.4 MG SL tablet Place 1 under the tongue as needed for chest pain, may repeat every 5 minutes for up three doses 100 tablet 1    rosuvastatin (CRESTOR) 20 MG tablet TAKE 1 TABLET EVERY NIGHT 90 tablet 3    Vitamin A 3 MG (93410 UT) capsule Take 1 capsule by mouth Daily.       No facility-administered medications prior to visit.       No opioid medication identified on active medication list. I have reviewed chart for other potential  high risk medication/s and harmful drug interactions in the elderly.        Aspirin is on active medication list. .      Patient Active Problem List   Diagnosis    Prostatitis    Diastasis of rectus abdominis    STEMI (ST elevation myocardial infarction)    Dyslipidemia    Mixed hyperlipidemia    Coronary artery disease involving native  "coronary artery of native heart without angina pectoris    High prostate specific antigen (PSA)    Acute bilateral low back pain without sciatica    Retention of urine    Encounter for annual physical exam     Advance Care Planning   Advance Care Planning     Advance Directive is not on file.  ACP discussion was held with the patient during this visit. Patient has an advance directive (not in EMR), copy requested.     Objective    Vitals:    23 1122   BP: 111/68   Pulse: 62   Temp: 98.7 øF (37.1 øC)   TempSrc: Infrared   SpO2: 97%   Weight: 89.3 kg (196 lb 12.8 oz)   Height: 177.8 cm (70\")   PainSc: 0-No pain     Estimated body mass index is 28.24 kg/mý as calculated from the following:    Height as of this encounter: 177.8 cm (70\").    Weight as of this encounter: 89.3 kg (196 lb 12.8 oz).    BMI is >= 25 and <30. (Overweight) The following options were offered after discussion;: weight loss educational material (shared in after visit summary)      Does the patient have evidence of cognitive impairment? No          HEALTH RISK ASSESSMENT    Smoking Status:  Social History     Tobacco Use   Smoking Status Never   Smokeless Tobacco Never     Alcohol Consumption:  Social History     Substance and Sexual Activity   Alcohol Use Yes    Alcohol/week: 10.0 standard drinks    Types: 10 Glasses of wine per week     Fall Risk Screen:    AMADEO Fall Risk Assessment was completed, and patient is at LOW risk for falls.Assessment completed on:2023    Depression Screenin/8/2023    11:20 AM   PHQ-2/PHQ-9 Depression Screening   Little Interest or Pleasure in Doing Things 0-->not at all   Feeling Down, Depressed or Hopeless 0-->not at all   PHQ-9: Brief Depression Severity Measure Score 0       Health Habits and Functional and Cognitive Screenin/8/2023    11:17 AM   Functional & Cognitive Status   Do you have difficulty preparing food and eating? No   Do you have difficulty bathing yourself, getting " dressed or grooming yourself? No   Do you have difficulty using the toilet? No   Do you have difficulty moving around from place to place? No   Do you have trouble with steps or getting out of a bed or a chair? No   Current Diet Well Balanced Diet   Dental Exam Not up to date   Eye Exam Not up to date   Current Exercises Include No Regular Exercise   Do you need help using the phone?  No   Are you deaf or do you have serious difficulty hearing?  No   Do you need help to go to places out of walking distance? No   Do you need help shopping? No   Do you need help preparing meals?  No   Do you need help with housework?  No   Do you need help with laundry? No   Do you need help taking your medications? No   Do you need help managing money? No   Do you ever drive or ride in a car without wearing a seat belt? No   Have you felt unusual stress, anger or loneliness in the last month? No   Who do you live with? Spouse   If you need help, do you have trouble finding someone available to you? No   Have you been bothered in the last four weeks by sexual problems? No   Do you have difficulty concentrating, remembering or making decisions? No       Age-appropriate Screening Schedule:  Refer to the list below for future screening recommendations based on patient's age, sex and/or medical conditions. Orders for these recommended tests are listed in the plan section. The patient has been provided with a written plan.    Health Maintenance   Topic Date Due    COLORECTAL CANCER SCREENING  Never done    Pneumococcal Vaccine 65+ (1 - PCV) 10/12/2023 (Originally 5/11/2023)    COVID-19 Vaccine (3 - Pfizer series) 10/28/2023 (Originally 5/22/2021)    HEPATITIS C SCREENING  08/08/2024 (Originally 2/9/2017)    ZOSTER VACCINE (1 of 2) 08/08/2024 (Originally 5/11/2008)    INFLUENZA VACCINE  10/01/2023    LIPID PANEL  10/19/2023    ANNUAL WELLNESS VISIT  08/08/2024    TDAP/TD VACCINES (2 - Td or Tdap) 12/05/2028                  CMS Preventative  "Services Quick Reference  Risk Factors Identified During Encounter  Colon cancer screening   The above risks/problems have been discussed with the patient.  Pertinent information has been shared with the patient in the After Visit Summary.  An After Visit Summary and PPPS were made available to the patient.    Follow Up:   Next Medicare Wellness visit to be scheduled in 1 year.       Additional E&M Note during same encounter follows:  Patient has multiple medical problems which are significant and separately identifiable that require additional work above and beyond the Medicare Wellness Visit.      Chief Complaint  Medicare Wellness-subsequent    Subjective        HPI    Jay Guadalupe is also being seen today for follow up on chronic issues and reestablish care with a PCP.     He had a significant issue with BPH over the last few months, now following with urology and symptoms are improving.  He is due for repeat PSA.  He has a history of CAD and follows with Cardiology. Recent stress was negative.          Objective   Vital Signs:  /68   Pulse 62   Temp 98.7 øF (37.1 øC) (Infrared)   Ht 177.8 cm (70\")   Wt 89.3 kg (196 lb 12.8 oz)   SpO2 97%   BMI 28.24 kg/mý     Physical Exam  Constitutional:       Appearance: He is normal weight.   HENT:      Head: Normocephalic.   Cardiovascular:      Rate and Rhythm: Normal rate and regular rhythm.      Pulses: Normal pulses.      Heart sounds: No murmur heard.  Pulmonary:      Effort: Pulmonary effort is normal. No respiratory distress.      Breath sounds: No wheezing.   Abdominal:      General: Abdomen is flat. Bowel sounds are normal.   Musculoskeletal:         General: No swelling or tenderness.      Cervical back: Normal range of motion.   Neurological:      General: No focal deficit present.      Mental Status: He is alert. Mental status is at baseline.   Psychiatric:         Mood and Affect: Mood normal.         Behavior: Behavior normal.         Thought " Content: Thought content normal.         Judgment: Judgment normal.               Assessment and Plan   Diagnoses and all orders for this visit:    1. Encounter for annual physical exam (Primary)  Assessment & Plan:  Annual wellness exam completed today. Health Maintenance including immunizations was updated and reflected in the chart. Yearly screening labs were ordered.     Further recommendations to be given once lab data received.     Health advice: healthy food choices with fresh fruits and vegetables, maintain sleep pattern at least 8 hours, avoid texting and distracted driving practices; wear safety belt, engage in regular exercise, maintain healthy weight, use safe sex practices, avoid alcohol and illicit drugs. Maintain immunizations that are up to date. Maintain health maintenance:PSA, Colonoscopy, etc.  Follow up with PCP if struggling with depression or anxiety. Keep regular dental and eye exams. Brush and floss teeth daily.     F/U annually and prn.       Orders:  -     Comprehensive Metabolic Panel; Future  -     CBC & Differential; Future  -     Hemoglobin A1c; Future  -     Lipid Panel; Future  -     TSH Rfx On Abnormal To Free T4; Future  -     Vitamin D,25-Hydroxy; Future  -     Vitamin B12; Future    2. Coronary artery disease involving native coronary artery of native heart without angina pectoris  Assessment & Plan:  Coronary artery disease is  stable  .  Continue current treatment regimen.  Dietary sodium restriction.  Regular aerobic exercise.  Cardiac status will be reassessed  at follow up and encouraged follow up with Cardiology  .      3. High prostate specific antigen (PSA)  Assessment & Plan:  Repeat PSA ordered, likely was due to prostatitis    Orders:  -     PSA DIAGNOSTIC ONLY; Future    4. Screening for colon cancer  -     Ambulatory Referral to Gastroenterology    5. Disorder of bone, unspecified  -     Vitamin D,25-Hydroxy; Future    6. Leg cramping  -     Magnesium;  Future        Follow Up   Return in about 1 year (around 8/8/2024) for Annual.  Patient was given instructions and counseling regarding his condition or for health maintenance advice. Please see specific information pulled into the AVS if appropriate.           This document has been electronically signed by Carmel Simons DO   August 8, 2023 12:12 EDT    Dictated Utilizing Dragon Dictation: Part of this note may be an electronic transcription/translation of spoken language to printed text using the Dragon Dictation System.    Carmel Simons D.O.  [facility] [place]

## 2023-09-18 ENCOUNTER — LAB (OUTPATIENT)
Dept: LAB | Facility: HOSPITAL | Age: 63
End: 2023-09-18
Payer: MEDICARE

## 2023-09-18 DIAGNOSIS — M89.9 DISORDER OF BONE, UNSPECIFIED: ICD-10-CM

## 2023-09-18 DIAGNOSIS — Z00.00 ENCOUNTER FOR ANNUAL PHYSICAL EXAM: ICD-10-CM

## 2023-09-18 DIAGNOSIS — R97.20 HIGH PROSTATE SPECIFIC ANTIGEN (PSA): ICD-10-CM

## 2023-09-18 DIAGNOSIS — R25.2 LEG CRAMPING: ICD-10-CM

## 2023-09-18 LAB
25(OH)D3 SERPL-MCNC: 26.4 NG/ML (ref 30–100)
ALBUMIN SERPL-MCNC: 4.7 G/DL (ref 3.5–5.2)
ALBUMIN/GLOB SERPL: 2 G/DL
ALP SERPL-CCNC: 71 U/L (ref 39–117)
ALT SERPL W P-5'-P-CCNC: 25 U/L (ref 1–41)
ANION GAP SERPL CALCULATED.3IONS-SCNC: 11 MMOL/L (ref 5–15)
AST SERPL-CCNC: 26 U/L (ref 1–40)
BASOPHILS # BLD AUTO: 0.02 10*3/MM3 (ref 0–0.2)
BASOPHILS NFR BLD AUTO: 0.4 % (ref 0–1.5)
BILIRUB SERPL-MCNC: 0.5 MG/DL (ref 0–1.2)
BUN SERPL-MCNC: 13 MG/DL (ref 8–23)
BUN/CREAT SERPL: 12.6 (ref 7–25)
CALCIUM SPEC-SCNC: 9 MG/DL (ref 8.6–10.5)
CHLORIDE SERPL-SCNC: 103 MMOL/L (ref 98–107)
CHOLEST SERPL-MCNC: 135 MG/DL (ref 0–200)
CO2 SERPL-SCNC: 25 MMOL/L (ref 22–29)
CREAT SERPL-MCNC: 1.03 MG/DL (ref 0.76–1.27)
DEPRECATED RDW RBC AUTO: 42.2 FL (ref 37–54)
EGFRCR SERPLBLD CKD-EPI 2021: 80.6 ML/MIN/1.73
EOSINOPHIL # BLD AUTO: 0.05 10*3/MM3 (ref 0–0.4)
EOSINOPHIL NFR BLD AUTO: 1 % (ref 0.3–6.2)
ERYTHROCYTE [DISTWIDTH] IN BLOOD BY AUTOMATED COUNT: 12.7 % (ref 12.3–15.4)
GLOBULIN UR ELPH-MCNC: 2.3 GM/DL
GLUCOSE SERPL-MCNC: 88 MG/DL (ref 65–99)
HBA1C MFR BLD: 5.6 % (ref 4.8–5.6)
HCT VFR BLD AUTO: 43.2 % (ref 37.5–51)
HDLC SERPL-MCNC: 48 MG/DL (ref 40–60)
HGB BLD-MCNC: 14.7 G/DL (ref 13–17.7)
IMM GRANULOCYTES # BLD AUTO: 0.01 10*3/MM3 (ref 0–0.05)
IMM GRANULOCYTES NFR BLD AUTO: 0.2 % (ref 0–0.5)
LDLC SERPL CALC-MCNC: 69 MG/DL (ref 0–100)
LDLC/HDLC SERPL: 1.41 {RATIO}
LYMPHOCYTES # BLD AUTO: 1.32 10*3/MM3 (ref 0.7–3.1)
LYMPHOCYTES NFR BLD AUTO: 27.3 % (ref 19.6–45.3)
MAGNESIUM SERPL-MCNC: 3.4 MG/DL (ref 1.6–2.4)
MCH RBC QN AUTO: 31.1 PG (ref 26.6–33)
MCHC RBC AUTO-ENTMCNC: 34 G/DL (ref 31.5–35.7)
MCV RBC AUTO: 91.5 FL (ref 79–97)
MONOCYTES # BLD AUTO: 0.43 10*3/MM3 (ref 0.1–0.9)
MONOCYTES NFR BLD AUTO: 8.9 % (ref 5–12)
NEUTROPHILS NFR BLD AUTO: 3 10*3/MM3 (ref 1.7–7)
NEUTROPHILS NFR BLD AUTO: 62.2 % (ref 42.7–76)
NRBC BLD AUTO-RTO: 0 /100 WBC (ref 0–0.2)
PLATELET # BLD AUTO: 223 10*3/MM3 (ref 140–450)
PMV BLD AUTO: 9.8 FL (ref 6–12)
POTASSIUM SERPL-SCNC: 4.2 MMOL/L (ref 3.5–5.2)
PROT SERPL-MCNC: 7 G/DL (ref 6–8.5)
PSA SERPL-MCNC: 11.2 NG/ML (ref 0–4)
RBC # BLD AUTO: 4.72 10*6/MM3 (ref 4.14–5.8)
SODIUM SERPL-SCNC: 139 MMOL/L (ref 136–145)
TRIGL SERPL-MCNC: 97 MG/DL (ref 0–150)
TSH SERPL DL<=0.05 MIU/L-ACNC: 2.88 UIU/ML (ref 0.27–4.2)
VIT B12 BLD-MCNC: 431 PG/ML (ref 211–946)
VLDLC SERPL-MCNC: 18 MG/DL (ref 5–40)
WBC NRBC COR # BLD: 4.83 10*3/MM3 (ref 3.4–10.8)

## 2023-09-18 PROCEDURE — 80053 COMPREHEN METABOLIC PANEL: CPT

## 2023-09-18 PROCEDURE — 82306 VITAMIN D 25 HYDROXY: CPT

## 2023-09-18 PROCEDURE — 82607 VITAMIN B-12: CPT

## 2023-09-18 PROCEDURE — 83735 ASSAY OF MAGNESIUM: CPT

## 2023-09-18 PROCEDURE — 80061 LIPID PANEL: CPT

## 2023-09-18 PROCEDURE — 84443 ASSAY THYROID STIM HORMONE: CPT

## 2023-09-18 PROCEDURE — 36415 COLL VENOUS BLD VENIPUNCTURE: CPT

## 2023-09-18 PROCEDURE — 83036 HEMOGLOBIN GLYCOSYLATED A1C: CPT

## 2023-09-18 PROCEDURE — 85025 COMPLETE CBC W/AUTO DIFF WBC: CPT

## 2023-09-18 PROCEDURE — 84153 ASSAY OF PSA TOTAL: CPT

## 2023-09-19 DIAGNOSIS — R97.20 ELEVATED PSA: Primary | ICD-10-CM

## 2023-09-20 ENCOUNTER — TELEPHONE (OUTPATIENT)
Dept: UROLOGY | Facility: CLINIC | Age: 63
End: 2023-09-20
Payer: MEDICARE

## 2023-09-21 DIAGNOSIS — E83.41 HYPERMAGNESEMIA: Primary | ICD-10-CM

## 2023-10-09 RX ORDER — ROSUVASTATIN CALCIUM 20 MG/1
20 TABLET, COATED ORAL NIGHTLY
Qty: 90 TABLET | Refills: 3 | Status: SHIPPED | OUTPATIENT
Start: 2023-10-09

## 2024-01-04 ENCOUNTER — TRANSCRIBE ORDERS (OUTPATIENT)
Dept: LAB | Facility: HOSPITAL | Age: 64
End: 2024-01-04
Payer: MEDICARE

## 2024-01-04 ENCOUNTER — LAB (OUTPATIENT)
Dept: LAB | Facility: HOSPITAL | Age: 64
End: 2024-01-04
Payer: MEDICARE

## 2024-01-04 DIAGNOSIS — E83.41 HYPERMAGNESEMIA: ICD-10-CM

## 2024-01-04 DIAGNOSIS — R97.20 ELEVATED PSA: Primary | ICD-10-CM

## 2024-01-04 LAB
ANION GAP SERPL CALCULATED.3IONS-SCNC: 11.3 MMOL/L (ref 5–15)
BUN SERPL-MCNC: 14 MG/DL (ref 8–23)
BUN/CREAT SERPL: 12.7 (ref 7–25)
CALCIUM SPEC-SCNC: 8.8 MG/DL (ref 8.6–10.5)
CHLORIDE SERPL-SCNC: 107 MMOL/L (ref 98–107)
CO2 SERPL-SCNC: 24.7 MMOL/L (ref 22–29)
CREAT SERPL-MCNC: 1.1 MG/DL (ref 0.76–1.27)
EGFRCR SERPLBLD CKD-EPI 2021: 74.5 ML/MIN/1.73
GLUCOSE SERPL-MCNC: 98 MG/DL (ref 65–99)
MAGNESIUM SERPL-MCNC: 2.4 MG/DL (ref 1.6–2.4)
POTASSIUM SERPL-SCNC: 4.5 MMOL/L (ref 3.5–5.2)
PSA SERPL-MCNC: 9.32 NG/ML (ref 0–4)
SODIUM SERPL-SCNC: 143 MMOL/L (ref 136–145)

## 2024-01-04 PROCEDURE — 80048 BASIC METABOLIC PNL TOTAL CA: CPT

## 2024-01-04 PROCEDURE — 84153 ASSAY OF PSA TOTAL: CPT

## 2024-01-04 PROCEDURE — 36415 COLL VENOUS BLD VENIPUNCTURE: CPT

## 2024-01-04 PROCEDURE — 83735 ASSAY OF MAGNESIUM: CPT

## 2024-01-04 RX ORDER — SODIUM PICOSULFATE, MAGNESIUM OXIDE, AND ANHYDROUS CITRIC ACID 12; 3.5; 1 G/175ML; G/175ML; MG/175ML
350 LIQUID ORAL ONCE
Qty: 350 ML | Refills: 0 | Status: SHIPPED | OUTPATIENT
Start: 2024-01-04 | End: 2024-01-04

## 2024-01-11 ENCOUNTER — OUTSIDE FACILITY SERVICE (OUTPATIENT)
Dept: GASTROENTEROLOGY | Facility: CLINIC | Age: 64
End: 2024-01-11
Payer: MEDICARE

## 2024-01-11 PROCEDURE — G0500 MOD SEDAT ENDO SERVICE >5YRS: HCPCS | Performed by: INTERNAL MEDICINE

## 2024-01-11 PROCEDURE — G0121 COLON CA SCRN NOT HI RSK IND: HCPCS | Performed by: INTERNAL MEDICINE

## 2024-01-24 ENCOUNTER — OFFICE VISIT (OUTPATIENT)
Dept: ORTHOPEDIC SURGERY | Facility: CLINIC | Age: 64
End: 2024-01-24
Payer: MEDICARE

## 2024-01-24 VITALS
HEIGHT: 70 IN | SYSTOLIC BLOOD PRESSURE: 122 MMHG | BODY MASS INDEX: 29.35 KG/M2 | DIASTOLIC BLOOD PRESSURE: 76 MMHG | WEIGHT: 205 LBS

## 2024-01-24 DIAGNOSIS — M72.2 PLANTAR FASCIITIS OF RIGHT FOOT: ICD-10-CM

## 2024-01-24 DIAGNOSIS — M79.671 BILATERAL FOOT PAIN: Primary | ICD-10-CM

## 2024-01-24 DIAGNOSIS — M20.22 HALLUX RIGIDUS, BILATERAL: ICD-10-CM

## 2024-01-24 DIAGNOSIS — M79.672 BILATERAL FOOT PAIN: Primary | ICD-10-CM

## 2024-01-24 DIAGNOSIS — M20.21 HALLUX RIGIDUS, BILATERAL: ICD-10-CM

## 2024-01-24 NOTE — PROGRESS NOTES
[facility] Note        Subjective     Pain of the Left Foot and Pain of the Right Foot      HPI    Jay Guadalupe is a 63 y.o. male.  This is a very pleasant patient presenting discuss his right hayde    Past Medical History:   Diagnosis Date    Benign prostatic hyperplasia     Enlarged prostate     Hip arthrosis     Low back strain 2019    Better now    Myocardial infarction 2021    Rotator cuff syndrome     Surgery to repair left shoulder complete tear    Visual impairment     Genitic RP (Retinitis Pigmentosa)      Past Surgical History:   Procedure Laterality Date    CARDIAC CATHETERIZATION N/A 2021    Procedure: Left Heart Cath;  Surgeon: Zaira Kerns MD;  Location: [facility] CATH INVASIVE LOCATION;  Service: Cardiovascular;  Laterality: N/A;    COLONOSCOPY      KNEE SURGERY  Oct. 2021    SHOULDER SURGERY        Family History   Problem Relation Age of Onset    Leukemia Mother     Fainting Mother     Cancer Mother          from Leukemia in     Aortic stenosis Father     Rheum arthritis Sister     Cancer Sister     No Known Problems Brother     No Known Problems Maternal Grandmother     No Known Problems Maternal Grandfather     No Known Problems Paternal Grandmother     No Known Problems Paternal Grandfather     No Known Problems Sister      Social History     Socioeconomic History    Marital status:    Tobacco Use    Smoking status: Never    Smokeless tobacco: Never   Vaping Use    Vaping Use: Never used   Substance and Sexual Activity    Alcohol use: Yes     Alcohol/week: 10.0 standard drinks of alcohol     Types: 10 Glasses of wine per week    Drug use: No    Sexual activity: Not Currently     Partners: Female      Current Outpatient Medications on File Prior to Visit   Medication Sig Dispense Refill    alfuzosin (UROXATRAL) 10 MG 24 hr tablet Take 1 tablet by mouth Daily.      aspirin 81 MG chewable tablet Chew 1 tablet Daily. 100  "tablet 3    finasteride (PROSCAR) 5 MG tablet Daily.      nitroglycerin (NITROSTAT) 0.4 MG SL tablet Place 1 under the tongue as needed for chest pain, may repeat every 5 minutes for up three doses 100 tablet 1    rosuvastatin (CRESTOR) 20 MG tablet Take 1 tablet by mouth Every Night. 90 tablet 3    Vitamin A 3 MG (20780 UT) capsule Take 1 capsule by mouth Daily.       No current facility-administered medications on file prior to visit.      No Known Allergies       Review of Systems   Constitutional: Negative.    HENT: Negative.     Eyes: Negative.    Respiratory: Negative.     Cardiovascular: Negative.    Gastrointestinal: Negative.    Endocrine: Negative.    Genitourinary: Negative.    Musculoskeletal:  Positive for arthralgias.   Skin: Negative.    Allergic/Immunologic: Negative.    Neurological: Negative.    Hematological: Negative.    Psychiatric/Behavioral: Negative.          I reviewed the patient's chief complaint, history of present illness, review of systems, past medical history, surgical history, family history, social history, medications and allergy list.        Objective      Physical Exam  /76   Ht 177.8 cm (70\")   Wt 93 kg (205 lb)   BMI 29.41 kg/m²     Body mass index is 29.41 kg/m².    General  Mental Status - alert  General Appearance - cooperative, well groomed, not in acute distress  Orientation - Oriented X3  Build & Nutrition - well developed and well nourished  Posture - normal posture  Gait -mildly antalgic       Ortho Exam  V:  Dorsalis Pedis:  Right: 2+; Left:2+    Posterior Tibial: Right:2+; Left:2+    Capillary Refill:  Brisk  MSK:      Tibia:  Right:  non tender; Left:  non tender      Ankle:  Right: non tender, ROM  normal, and motor function  normal; Left:  non tender, ROM  normal, and motor function  normal      Foot:  Right:   Tender over the origin of the plantar fascia with stiffness in the first MTP joint, not painful nontender.  Normal motion ; Left:   Mildly tender " over the fifth metatarsal head with callus.  Stiffness of the first MTP joint.  Remainder the foot and ankle are nontender.  Normal motion.      NEURO: Heel Walking:  Right: Painful; Left:  normal    Toe Walking:  Right:  able to get up on toes, difficulty with balance; Left:  able to get up on toes, difficulty with balance     [place] 5.07 monofilament test: normal    Lower extremity sensation: intact     Calf Atrophy:none    Motor Function: all 5/5        Imaging/Studies  Standing AP lateral both feet, ordered for pain, shows bilateral mild hallux rigidus with narrowing and squaring of the joint, some narrowing of the second TMT bilaterally, mild cavus arches, some congenital valgus angulation of the second toe DIP joint bilaterally, no fractures, no comparison         Assessment    Assessment:  1. Bilateral foot pain    2. Hallux rigidus, bilateral    3. Plantar fasciitis of right foot          Plan:  Recommend over-the-counter medication as needed for discomfort  Bilateral foot pain I reviewed today's x-rays clinical findings past and current treatment the patient.  He has mild hexhead hallux rigidus of bilateral feet with stiffness in both first MTP joints.  I explained to him that the pain he is having over the fifth metatarsal head is likely secondary to the stiff first MTP joint.  When he walks heel-to-toe because his first MTH P joint will not move it is overloading the fifth metatarsal head which is why it is also developing a callus.  This is best treated with wide toed shoes to avoid pressure at the foot and fifth metatarsal head as well as a pair of custom orthotics to offload.  There is no surgery to be done for this problem.  I have given him prescription for custom orthotics and he will return to see us as needed.  3.  Right plantar fasciitis:  I explained plantar fasciitis in detail to the patient.  I explained to the bow-string mechanism of the plantar fascia.  I went over the current  "research of the [employer] with them.  I explained the treatments that were not statistically significant in improving the problem including: injection of steroids, special orthotics, special shoes, surgery, medication, ice, time off work, etc.    I explained the treatments that are statistically significant at improving the problem.  These include stretching/night splinting, casting.    I explained the most important treatment: Stretching and night splinting.  I went over the patient information sheet with them, I showed them the stretches and they were able to reproduce them.  I emphasized the \"toe pull\" as the most important stretch.  They need to do the stretches 5 repetitions each, 6-8 times per day.  I explained how to do them at work, at home, before getting out of bed, before getting out of the car, and before getting up from a chair.    If they do not improve after 4 months of aggressive stretching and night splinting, the next step will be a short leg fiberglass walking cast worn for 6 weeks.    Preprinted education was provided to the patient.    Patient has a night splint at home.  He will begin stretching and return for casting if needed.    Patient history, diagnosis and treatment plan discussed with Dr. Bone.      Andreina Hsieh PA-C  01/25/24  14:58 EST  "

## 2024-03-08 ENCOUNTER — OFFICE VISIT (OUTPATIENT)
Dept: CARDIOLOGY | Facility: CLINIC | Age: 64
End: 2024-03-08
Payer: MEDICARE

## 2024-03-08 VITALS
WEIGHT: 209.2 LBS | HEART RATE: 68 BPM | OXYGEN SATURATION: 97 % | SYSTOLIC BLOOD PRESSURE: 106 MMHG | DIASTOLIC BLOOD PRESSURE: 64 MMHG | BODY MASS INDEX: 29.95 KG/M2 | HEIGHT: 70 IN

## 2024-03-08 DIAGNOSIS — E78.5 DYSLIPIDEMIA: ICD-10-CM

## 2024-03-08 DIAGNOSIS — M72.2 PLANTAR FASCIITIS: ICD-10-CM

## 2024-03-08 DIAGNOSIS — I25.10 CORONARY ARTERY DISEASE INVOLVING NATIVE CORONARY ARTERY OF NATIVE HEART WITHOUT ANGINA PECTORIS: Primary | ICD-10-CM

## 2024-03-08 PROCEDURE — 1160F RVW MEDS BY RX/DR IN RCRD: CPT

## 2024-03-08 PROCEDURE — 99214 OFFICE O/P EST MOD 30 MIN: CPT

## 2024-03-08 PROCEDURE — 93000 ELECTROCARDIOGRAM COMPLETE: CPT

## 2024-03-08 PROCEDURE — 1159F MED LIST DOCD IN RCRD: CPT

## 2024-03-08 NOTE — PROGRESS NOTES
[facility] Cardiology    Encounter Date: 2024    Patient ID: Jay Guadalupe is a 63 y.o. male.  : 1960     PCP: Carmel Simons DO       Chief Complaint: Coronary artery disease involving native coronary artery of      PROBLEM LIST:  Coronary artery disease  STEMI, 2021  [facility], 2021: EF 60%. 100% occlusion of infarct-related mid left circumflex coronary artery. Successful thrombectomy followed by PTCA and placement of 3.0 x 23 mm BENITEZ to mid circumflex reducing 100% stenosis to 0%. 90% stenosis of the distal LAD successfully stented with 2.5 x 23 mm BENITEZ postdilated to 3.0 mm and reduced to 0%. Residual mild nonobstructive plaque of the proximal LAD, moderate nonobstructive plaque of the mid RCA and 60 to 70% stenosis of the distal RCA. Mild high lateral hypokinesis.   Echo, 2021: EF 55%. Mild concentric LVH. Mild MR. Trace TR with normal RVSP.   Stress test 2023: Myocardial perfusion imaging indicates a moderate size infarct of the basal inferior lateral wall with no significant ischemia  Dyslipidemia       History of Present Illness  Patient is a very pleasant 63-year-old male who is accompanied by his wife and presents today for a follow-up with a history of CAD and cardiac risk factors. Since last visit, patient has been doing well from a cardiac standpoint.  He has not been able to exercise the way that he would like due to plantar fasciitis.  He does occasionally go for walks and is tolerating this well.  He denies any chest pain, shortness of air, palpitations, Thiry, edema, presyncope or syncope.    No Known Allergies      Current Outpatient Medications:     alfuzosin (UROXATRAL) 10 MG 24 hr tablet, Take 1 tablet by mouth Daily., Disp: , Rfl:     aspirin 81 MG chewable tablet, Chew 1 tablet Daily., Disp: 100 tablet, Rfl: 3    finasteride (PROSCAR) 5 MG tablet, Daily., Disp: , Rfl:     nitroglycerin (NITROSTAT) 0.4 MG SL tablet, Place 1 under the  "tongue as needed for chest pain, may repeat every 5 minutes for up three doses, Disp: 100 tablet, Rfl: 1    rosuvastatin (CRESTOR) 20 MG tablet, Take 1 tablet by mouth Every Night., Disp: 90 tablet, Rfl: 3    Vitamin A 3 MG (17739 UT) capsule, Take 1 capsule by mouth Daily., Disp: , Rfl:     The following portions of the patient's history were reviewed and updated as appropriate: allergies, current medications, past family history, past medical history, past social history, past surgical history and problem list.        Objective:     /64 (BP Location: Right arm, Patient Position: Sitting, Cuff Size: Adult)   Pulse 68   Ht 177.8 cm (70\")   Wt 94.9 kg (209 lb 3.2 oz)   SpO2 97%   BMI 30.02 kg/m²      Physical Exam  Constitutional: Patient appears well-developed and well-nourished.   HENT: HEENT exam unremarkable.   Neck: Neck supple. No JVD present. No carotid bruits.   Cardiovascular: Normal rate, regular rhythm and normal heart sounds. No murmur heard.   2+ symmetric pulses.   Pulmonary/Chest: Breath sounds normal. Does not exhibit tenderness.   Musculoskeletal: Does not exhibit edema.   Neurological: Neurological exam unremarkable.   Vitals reviewed.    Data Review:   Lab Results   Component Value Date    GLUCOSE 98 01/04/2024    BUN 14 01/04/2024    CREATININE 1.10 01/04/2024    EGFR 74.5 01/04/2024    BCR 12.7 01/04/2024     01/04/2024    K 4.5 01/04/2024    CO2 24.7 01/04/2024    CALCIUM 8.8 01/04/2024    ALBUMIN 4.7 09/18/2023    AST 26 09/18/2023    ALT 25 09/18/2023     Lab Results   Component Value Date    CHOL 135 09/18/2023    TRIG 97 09/18/2023    HDL 48 09/18/2023    LDL 69 09/18/2023      Lab Results   Component Value Date    WBC 4.83 09/18/2023    RBC 4.72 09/18/2023    HGB 14.7 09/18/2023    HCT 43.2 09/18/2023    MCV 91.5 09/18/2023     09/18/2023     Lab Results   Component Value Date    TSH 2.880 09/18/2023     Lab Results   Component Value Date    HGBA1C 5.60 09/18/2023 "          ECG 12 Lead    Date/Time: 3/8/2024 3:42 PM  Performed by: Aileen Powers PA-C    Authorized by: Aileen Powers PA-C  Comparison: compared with previous ECG from 10/14/2022  Similar to previous ECG  Rhythm: sinus rhythm  Rate: normal  BPM: 68    Clinical impression: normal ECG                  Assessment and plan:      Diagnosis Plan   1. Coronary artery disease involving native coronary artery of native heart without angina pectoris  Stable without current angina.  Continue aspirin and Crestor.      2. Dyslipidemia  LDL at target.  Continue Crestor 20 mg daily      3. Plantar fasciitis  Ambulatory Referral to Physical Therapy  Patient request [facility] [address] [facility]        Discussed the importance of heart healthy diet and exercise.  He will call our office if he develops any symptoms consistent with angina or other features of angina.  FU in 6 MO, sooner as needed.  Thank you for allowing us to participate in the care of your patient.       Aileen Powers PA-C      Please note that portions of this note may have been completed with a voice recognition program. Efforts were made to edit the dictations, but occasionally words are mistranscribed.

## 2024-03-12 ENCOUNTER — TELEPHONE (OUTPATIENT)
Dept: FAMILY MEDICINE CLINIC | Facility: CLINIC | Age: 64
End: 2024-03-12

## 2024-03-12 NOTE — TELEPHONE ENCOUNTER
Caller: Jay Guadalupe    Relationship: Self    Best call back number: [phone]     What orders are you requesting (i.e. lab or imaging): LIPID PANEL     In what timeframe would the patient need to come in: ASAP    Where will you receive your lab/imaging services: IN OFFICE    Additional notes: PATIENT WOULD  LIKE TO HAVE AN LIPID PANEL DONE.

## 2024-03-14 ENCOUNTER — TELEPHONE (OUTPATIENT)
Dept: CARDIOLOGY | Facility: CLINIC | Age: 64
End: 2024-03-14

## 2024-03-14 NOTE — TELEPHONE ENCOUNTER
Caller: Jay Guadalupe    Relationship: Self    Best call back number: [phone]    What form or medical record are you requesting: REFERRAL FOR PT    Who is requesting this form or medical record from you: PATIENT    How would you like to receive the form or medical records (pick-up, mail, fax): FAX  If fax, what is the fax number: [phone]    Timeframe paperwork needed: ASAP IF POSSIBLE    Additional notes:

## 2024-04-01 ENCOUNTER — OFFICE VISIT (OUTPATIENT)
Dept: FAMILY MEDICINE CLINIC | Facility: CLINIC | Age: 64
End: 2024-04-01
Payer: MEDICARE

## 2024-04-01 ENCOUNTER — LAB (OUTPATIENT)
Dept: LAB | Facility: HOSPITAL | Age: 64
End: 2024-04-01
Payer: MEDICARE

## 2024-04-01 ENCOUNTER — TRANSCRIBE ORDERS (OUTPATIENT)
Dept: LAB | Facility: HOSPITAL | Age: 64
End: 2024-04-01
Payer: MEDICARE

## 2024-04-01 VITALS
TEMPERATURE: 97.8 F | DIASTOLIC BLOOD PRESSURE: 68 MMHG | SYSTOLIC BLOOD PRESSURE: 115 MMHG | HEIGHT: 70 IN | HEART RATE: 71 BPM | OXYGEN SATURATION: 98 % | WEIGHT: 209 LBS | BODY MASS INDEX: 29.92 KG/M2

## 2024-04-01 DIAGNOSIS — K21.9 GASTROESOPHAGEAL REFLUX DISEASE WITHOUT ESOPHAGITIS: ICD-10-CM

## 2024-04-01 DIAGNOSIS — E78.2 MIXED HYPERLIPIDEMIA: ICD-10-CM

## 2024-04-01 DIAGNOSIS — E55.9 VITAMIN D DEFICIENCY: ICD-10-CM

## 2024-04-01 DIAGNOSIS — R97.20 ELEVATED PSA: Primary | ICD-10-CM

## 2024-04-01 DIAGNOSIS — H11.31 SUBCONJUNCTIVAL HEMORRHAGE OF RIGHT EYE: ICD-10-CM

## 2024-04-01 DIAGNOSIS — R97.20 ELEVATED PSA: ICD-10-CM

## 2024-04-01 DIAGNOSIS — E78.2 MIXED HYPERLIPIDEMIA: Primary | ICD-10-CM

## 2024-04-01 LAB
25(OH)D3 SERPL-MCNC: 24.7 NG/ML (ref 30–100)
ALBUMIN SERPL-MCNC: 4.4 G/DL (ref 3.5–5.2)
ALBUMIN/GLOB SERPL: 1.8 G/DL
ALP SERPL-CCNC: 63 U/L (ref 39–117)
ALT SERPL W P-5'-P-CCNC: 24 U/L (ref 1–41)
ANION GAP SERPL CALCULATED.3IONS-SCNC: 9 MMOL/L (ref 5–15)
AST SERPL-CCNC: 19 U/L (ref 1–40)
BASOPHILS # BLD AUTO: 0.03 10*3/MM3 (ref 0–0.2)
BASOPHILS NFR BLD AUTO: 0.6 % (ref 0–1.5)
BILIRUB SERPL-MCNC: 0.4 MG/DL (ref 0–1.2)
BUN SERPL-MCNC: 12 MG/DL (ref 8–23)
BUN/CREAT SERPL: 10.9 (ref 7–25)
CALCIUM SPEC-SCNC: 9 MG/DL (ref 8.6–10.5)
CHLORIDE SERPL-SCNC: 107 MMOL/L (ref 98–107)
CHOLEST SERPL-MCNC: 156 MG/DL (ref 0–200)
CO2 SERPL-SCNC: 25 MMOL/L (ref 22–29)
CREAT SERPL-MCNC: 1.1 MG/DL (ref 0.76–1.27)
DEPRECATED RDW RBC AUTO: 38.3 FL (ref 37–54)
EGFRCR SERPLBLD CKD-EPI 2021: 74.5 ML/MIN/1.73
EOSINOPHIL # BLD AUTO: 0.06 10*3/MM3 (ref 0–0.4)
EOSINOPHIL NFR BLD AUTO: 1.3 % (ref 0.3–6.2)
ERYTHROCYTE [DISTWIDTH] IN BLOOD BY AUTOMATED COUNT: 11.8 % (ref 12.3–15.4)
GLOBULIN UR ELPH-MCNC: 2.5 GM/DL
GLUCOSE SERPL-MCNC: 88 MG/DL (ref 65–99)
HCT VFR BLD AUTO: 42.1 % (ref 37.5–51)
HDLC SERPL-MCNC: 38 MG/DL (ref 40–60)
HGB BLD-MCNC: 14.1 G/DL (ref 13–17.7)
IMM GRANULOCYTES # BLD AUTO: 0.01 10*3/MM3 (ref 0–0.05)
IMM GRANULOCYTES NFR BLD AUTO: 0.2 % (ref 0–0.5)
LDLC SERPL CALC-MCNC: 96 MG/DL (ref 0–100)
LDLC/HDLC SERPL: 2.47 {RATIO}
LYMPHOCYTES # BLD AUTO: 1.33 10*3/MM3 (ref 0.7–3.1)
LYMPHOCYTES NFR BLD AUTO: 27.7 % (ref 19.6–45.3)
MCH RBC QN AUTO: 30.3 PG (ref 26.6–33)
MCHC RBC AUTO-ENTMCNC: 33.5 G/DL (ref 31.5–35.7)
MCV RBC AUTO: 90.3 FL (ref 79–97)
MONOCYTES # BLD AUTO: 0.44 10*3/MM3 (ref 0.1–0.9)
MONOCYTES NFR BLD AUTO: 9.2 % (ref 5–12)
NEUTROPHILS NFR BLD AUTO: 2.93 10*3/MM3 (ref 1.7–7)
NEUTROPHILS NFR BLD AUTO: 61 % (ref 42.7–76)
NRBC BLD AUTO-RTO: 0 /100 WBC (ref 0–0.2)
PLATELET # BLD AUTO: 238 10*3/MM3 (ref 140–450)
PMV BLD AUTO: 9.8 FL (ref 6–12)
POTASSIUM SERPL-SCNC: 4.5 MMOL/L (ref 3.5–5.2)
PROT SERPL-MCNC: 6.9 G/DL (ref 6–8.5)
PSA SERPL-MCNC: 7.96 NG/ML (ref 0–4)
RBC # BLD AUTO: 4.66 10*6/MM3 (ref 4.14–5.8)
SODIUM SERPL-SCNC: 141 MMOL/L (ref 136–145)
TRIGL SERPL-MCNC: 121 MG/DL (ref 0–150)
VLDLC SERPL-MCNC: 22 MG/DL (ref 5–40)
WBC NRBC COR # BLD AUTO: 4.8 10*3/MM3 (ref 3.4–10.8)

## 2024-04-01 PROCEDURE — 82306 VITAMIN D 25 HYDROXY: CPT

## 2024-04-01 PROCEDURE — 36415 COLL VENOUS BLD VENIPUNCTURE: CPT

## 2024-04-01 PROCEDURE — 80053 COMPREHEN METABOLIC PANEL: CPT

## 2024-04-01 PROCEDURE — 80061 LIPID PANEL: CPT

## 2024-04-01 PROCEDURE — 85025 COMPLETE CBC W/AUTO DIFF WBC: CPT

## 2024-04-01 PROCEDURE — 99214 OFFICE O/P EST MOD 30 MIN: CPT | Performed by: FAMILY MEDICINE

## 2024-04-01 PROCEDURE — 84153 ASSAY OF PSA TOTAL: CPT

## 2024-04-01 RX ORDER — OMEPRAZOLE 20 MG/1
20 CAPSULE, DELAYED RELEASE ORAL DAILY
Qty: 90 CAPSULE | Refills: 1 | Status: SHIPPED | OUTPATIENT
Start: 2024-04-01

## 2024-04-01 NOTE — PROGRESS NOTES
Subjective     Chief Complaint  Bloodwork and Heartburn    Subjective          Jay Guadalupe is a 63 y.o. male who presents today to [facility] for follow up.    HPI:   History of Present Illness    Mr. Guadalupe is a very pleasant 63 year old male who presents today to follow up on chronic conditions.     He has a past medical history of CAD, Hyperlipidemia, elevated PSA. BPH.     Acutely he has redness and irritation of the right eye. This occurred spontaneously. He flew from [place] on Saturday and the symptoms started that evening. He states that he noted some irritation and feelings as if there is something in it.     He has a history of GERD that he has been taking Omeprazole for and it has made a significant benefit.       The following portions of the patient's history were reviewed and updated as appropriate: allergies, current medications, past family history, past medical history, past social history, past surgical history and problem list.    Objective     Objective     Allergy:   No Known Allergies     Current Medications:   Current Outpatient Medications   Medication Sig Dispense Refill    alfuzosin (UROXATRAL) 10 MG 24 hr tablet Take 1 tablet by mouth Daily.      aspirin 81 MG chewable tablet Chew 1 tablet Daily. 100 tablet 3    finasteride (PROSCAR) 5 MG tablet Daily.      nitroglycerin (NITROSTAT) 0.4 MG SL tablet Place 1 under the tongue as needed for chest pain, may repeat every 5 minutes for up three doses 100 tablet 1    rosuvastatin (CRESTOR) 20 MG tablet Take 1 tablet by mouth Every Night. 90 tablet 3    Vitamin A 3 MG (05082 UT) capsule Take 1 capsule by mouth Daily.      omeprazole (priLOSEC) 20 MG capsule Take 1 capsule by mouth Daily. 90 capsule 1     No current facility-administered medications for this visit.       Past Medical History:  Past Medical History:   Diagnosis Date    Benign prostatic hyperplasia 2008    Enlarged prostate     GERD  "(gastroesophageal reflux disease) 2016    Hip arthrosis     Low back strain 2019    Better now    Myocardial infarction 2021    Rotator cuff syndrome     Surgery to repair left shoulder complete tear    Visual impairment     Genitic RP (Retinitis Pigmentosa)       Past Surgical History:  Past Surgical History:   Procedure Laterality Date    CARDIAC CATHETERIZATION N/A 2021    Procedure: Left Heart Cath;  Surgeon: Zaira Kerns MD;  Location: [facility] CATH INVASIVE LOC[facility];  Service: Cardiovascular;  Laterality: N/A;    COLONOSCOPY      KNEE SURGERY  Oct. 2021    SHOULDER SURGERY         Social History:  Social History     Socioeconomic History    Marital status:    Tobacco Use    Smoking status: Never     Passive exposure: Never    Smokeless tobacco: Never   Vaping Use    Vaping status: Never Used   Substance and Sexual Activity    Alcohol use: Yes     Alcohol/week: 10.0 standard drinks of alcohol     Types: 10 Glasses of wine per week     Comment: 10 glasses a week    Drug use: No    Sexual activity: Not Currently     Partners: Female       Family History:  Family History   Problem Relation Age of Onset    Leukemia Mother     Fainting Mother     Cancer Mother          from Leukemia in     Aortic stenosis Father     Rheum arthritis Sister     Cancer Sister     No Known Problems Brother     No Known Problems Maternal Grandmother     No Known Problems Maternal Grandfather     No Known Problems Paternal Grandmother     No Known Problems Paternal Grandfather     No Known Problems Sister          Vital Signs:   /68   Pulse 71   Temp 97.8 °F (36.6 °C) (Temporal)   Ht 177.8 cm (70\")   Wt 94.8 kg (209 lb)   SpO2 98%   BMI 29.99 kg/m²      Physical Exam:  Physical Exam  Vitals reviewed.   Constitutional:       Appearance: He is not ill-appearing.   Eyes:      Pupils: Pupils are equal, round, and reactive to light.      Comments: Subconjunctival hemorrhage right eye.  "   Cardiovascular:      Rate and Rhythm: Normal rate.      Pulses: Normal pulses.   Pulmonary:      Effort: Pulmonary effort is normal.      Breath sounds: Normal breath sounds.   Neurological:      General: No focal deficit present.      Mental Status: He is alert. Mental status is at baseline.   Psychiatric:         Mood and Affect: Mood normal.         Behavior: Behavior normal.         Thought Content: Thought content normal.         Judgment: Judgment normal.               PHQ-9 Score  PHQ-9 Total Score: 0     Lab Review  Lab on 01/04/2024   Component Date Value Ref Range Status    Magnesium 01/04/2024 2.4  1.6 - 2.4 mg/dL Final    Glucose 01/04/2024 98  65 - 99 mg/dL Final    BUN 01/04/2024 14  8 - 23 mg/dL Final    Creatinine 01/04/2024 1.10  0.76 - 1.27 mg/dL Final    Sodium 01/04/2024 143  136 - 145 mmol/L Final    Potassium 01/04/2024 4.5  3.5 - 5.2 mmol/L Final    Chloride 01/04/2024 107  98 - 107 mmol/L Final    CO2 01/04/2024 24.7  22.0 - 29.0 mmol/L Final    Calcium 01/04/2024 8.8  8.6 - 10.5 mg/dL Final    BUN/Creatinine Ratio 01/04/2024 12.7  7.0 - 25.0 Final    Anion Gap 01/04/2024 11.3  5.0 - 15.0 mmol/L Final    eGFR 01/04/2024 74.5  >60.0 mL/min/1.73 Final    PSA 01/04/2024 9.320 (H)  0.000 - 4.000 ng/mL Final        Radiology Results        Assessment / Plan         Assessment and Plan   Diagnoses and all orders for this visit:    1. Mixed hyperlipidemia (Primary)  Assessment & Plan:   Lipid abnormalities are improving with treatment    Plan:  Continue same medication/s without change.      Discussed medication dosage, use, side effects, and goals of treatment in detail.    Counseled patient on lifestyle modifications to help control hyperlipidemia.     Patient Treatment Goals:   LDL goal is less than 70    Followup in 6 months.    Orders:  -     CBC & Differential; Future  -     Comprehensive Metabolic Panel; Future  -     Lipid Panel; Future  -     Vitamin D,25-Hydroxy; Future    2.  Subconjunctival hemorrhage of right eye  Assessment & Plan:  Reassurance given, warm compress, if worsened follow up with Optometry       3. Gastroesophageal reflux disease without esophagitis  Assessment & Plan:  Improved with Omeprazole, rx sent     Orders:  -     omeprazole (priLOSEC) 20 MG capsule; Take 1 capsule by mouth Daily.  Dispense: 90 capsule; Refill: 1    4. Vitamin D deficiency  -     Vitamin D,25-Hydroxy; Future        Discussed possible differential diagnoses, testing, treatment, recommended non-pharmacological interventions, risks, warning signs to monitor for that would indicate need for follow-up in clinic or ER. If no improvement with these regimens or you have new or worsening symptoms follow-up. Patient verbalizes understanding and agreement with plan of care. Denies further needs or concerns.     Patient was given instructions and counseling regarding her condition and for health maintenance advised.              Health Maintenance  Health Maintenance: There are no preventive care reminders to display for this patient.     Meds ordered during this visit  New Medications Ordered This Visit   Medications    omeprazole (priLOSEC) 20 MG capsule     Sig: Take 1 capsule by mouth Daily.     Dispense:  90 capsule     Refill:  1       Meds stopped during this visit:  There are no discontinued medications.     Visit Diagnoses    ICD-10-CM ICD-9-CM   1. Mixed hyperlipidemia  E78.2 272.2   2. Subconjunctival hemorrhage of right eye  H11.31 372.72   3. Gastroesophageal reflux disease without esophagitis  K21.9 530.81   4. Vitamin D deficiency  E55.9 268.9       Patient was given instructions and counseling regarding his condition or for health maintenance advice. Please see specific information pulled into the AVS if appropriate.     Follow Up   Return for Medicare Wellness.        This document has been electronically signed by Carmel Simons DO   April 1, 2024 09:19 EDT    Dictated Utilizing Dragon  Dictation: Part of this note may be an electronic transcription/translation of spoken language to printed text using the Dragon Dictation System.    Carmel Simons D.O.  [facility] [place]

## 2024-04-02 RX ORDER — ERGOCALCIFEROL 1.25 MG/1
50000 CAPSULE ORAL WEEKLY
Qty: 12 CAPSULE | Refills: 0 | Status: SHIPPED | OUTPATIENT
Start: 2024-04-02

## 2024-07-15 ENCOUNTER — OFFICE VISIT (OUTPATIENT)
Dept: FAMILY MEDICINE CLINIC | Facility: CLINIC | Age: 64
End: 2024-07-15
Payer: MEDICARE

## 2024-07-15 VITALS
HEART RATE: 78 BPM | RESPIRATION RATE: 20 BRPM | BODY MASS INDEX: 29.01 KG/M2 | DIASTOLIC BLOOD PRESSURE: 64 MMHG | SYSTOLIC BLOOD PRESSURE: 106 MMHG | HEIGHT: 70 IN | TEMPERATURE: 98.4 F | OXYGEN SATURATION: 99 % | WEIGHT: 202.6 LBS

## 2024-07-15 DIAGNOSIS — L03.113 CELLULITIS OF RIGHT UPPER EXTREMITY: Primary | ICD-10-CM
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
## 2024-07-15 PROCEDURE — 99213 OFFICE O/P EST LOW 20 MIN: CPT | Performed by: STUDENT IN AN ORGANIZED HEALTH CARE EDUCATION/TRAINING PROGRAM

## 2024-07-15 PROCEDURE — 1126F AMNT PAIN NOTED NONE PRSNT: CPT | Performed by: STUDENT IN AN ORGANIZED HEALTH CARE EDUCATION/TRAINING PROGRAM

## 2024-07-15 RX ORDER — CLOBETASOL PROPIONATE 0.5 MG/G
1 CREAM TOPICAL 2 TIMES DAILY
Qty: 30 G | Refills: 0 | Status: SHIPPED | OUTPATIENT
Start: 2024-07-15

## 2024-07-15 RX ORDER — SULFAMETHOXAZOLE AND TRIMETHOPRIM 800; 160 MG/1; MG/1
1 TABLET ORAL 2 TIMES DAILY
Qty: 14 TABLET | Refills: 0 | Status: SHIPPED | OUTPATIENT
Start: 2024-07-15 | End: 2024-07-22

## 2024-07-15 NOTE — PROGRESS NOTES
Established Patient Office Visit        Subjective      Chief Complaint:  Insect Bite (Insect bite, happened two days ago,redness to right inside of upper arm)      History of Present Illness: Jay Guadalupe is a 64 y.o. male who presents for insect bite 48 hours ago  spreading redness no fever.       Patient Active Problem List   Diagnosis    Prostatitis    Diastasis of rectus abdominis    STEMI (ST elevation myocardial infarction)    Dyslipidemia    Mixed hyperlipidemia    Coronary artery disease involving native coronary artery of native heart without angina pectoris    High prostate specific antigen (PSA)    Acute bilateral low back pain without sciatica    Retention of urine    Encounter for annual physical exam    Gastroesophageal reflux disease without esophagitis    Subconjunctival hemorrhage of right eye         Current Outpatient Medications:     alfuzosin (UROXATRAL) 10 MG 24 hr tablet, Take 1 tablet by mouth Daily., Disp: , Rfl:     aspirin 81 MG chewable tablet, Chew 1 tablet Daily., Disp: 100 tablet, Rfl: 3    finasteride (PROSCAR) 5 MG tablet, Daily., Disp: , Rfl:     nitroglycerin (NITROSTAT) 0.4 MG SL tablet, Place 1 under the tongue as needed for chest pain, may repeat every 5 minutes for up three doses, Disp: 100 tablet, Rfl: 1    omeprazole (priLOSEC) 20 MG capsule, Take 1 capsule by mouth Daily., Disp: 90 capsule, Rfl: 1    rosuvastatin (CRESTOR) 20 MG tablet, Take 1 tablet by mouth Every Night., Disp: 90 tablet, Rfl: 3    Vitamin A 3 MG (03863 UT) capsule, Take 1 capsule by mouth Daily., Disp: , Rfl:     clobetasol propionate (TEMOVATE) 0.05 % cream, Apply 1 Application topically to the appropriate area as directed 2 (Two) Times a Day., Disp: 30 g, Rfl: 0    sulfamethoxazole-trimethoprim (Bactrim DS) 800-160 MG per tablet, Take 1 tablet by mouth 2 (Two) Times a Day for 7 days., Disp: 14 tablet, Rfl: 0    vitamin D (ERGOCALCIFEROL) 1.25 MG (21093 UT) capsule capsule, Take 1 capsule by mouth 1  "(One) Time Per Week. (Patient not taking: Reported on 7/15/2024), Disp: 12 capsule, Rfl: 0       Objective     Physical Exam:   Vital Signs:   /64   Pulse 78   Temp 98.4 °F (36.9 °C) (Temporal)   Resp 20   Ht 177.8 cm (70\")   Wt 91.9 kg (202 lb 9.6 oz)   SpO2 99%   BMI 29.07 kg/m²      Physical Exam  Constitutional:       General: He is not in acute distress.     Appearance: He is not ill-appearing.   There is erythema starts at mid to proximal upper arm and progresses to the right elbow on the inner arm.  No fluctuance             Assessment / Plan      Assessment/Plan:   Diagnoses and all orders for this visit:    1. Cellulitis of right upper extremity (Primary)  -     sulfamethoxazole-trimethoprim (Bactrim DS) 800-160 MG per tablet; Take 1 tablet by mouth 2 (Two) Times a Day for 7 days.  Dispense: 14 tablet; Refill: 0    2. Insect bite, unspecified site, initial encounter  -     clobetasol propionate (TEMOVATE) 0.05 % cream; Apply 1 Application topically to the appropriate area as directed 2 (Two) Times a Day.  Dispense: 30 g; Refill: 0       F/u for worsneing     Follow Up:   No follow-ups on file.    MDM:     Richmond Salazar MD  Family Medicine - [facility]  "

## 2024-08-22 ENCOUNTER — OFFICE VISIT (OUTPATIENT)
Dept: FAMILY MEDICINE CLINIC | Facility: CLINIC | Age: 64
End: 2024-08-22
Payer: MEDICARE

## 2024-08-22 VITALS
HEIGHT: 70 IN | SYSTOLIC BLOOD PRESSURE: 112 MMHG | BODY MASS INDEX: 28.35 KG/M2 | TEMPERATURE: 98.2 F | DIASTOLIC BLOOD PRESSURE: 88 MMHG | HEART RATE: 68 BPM | WEIGHT: 198 LBS

## 2024-08-22 DIAGNOSIS — Z11.59 NEED FOR HEPATITIS C SCREENING TEST: ICD-10-CM

## 2024-08-22 DIAGNOSIS — Z00.00 ENCOUNTER FOR SUBSEQUENT ANNUAL WELLNESS VISIT (AWV) IN MEDICARE PATIENT: Primary | ICD-10-CM

## 2024-08-22 DIAGNOSIS — E55.9 VITAMIN D DEFICIENCY: ICD-10-CM

## 2024-08-22 PROCEDURE — G0439 PPPS, SUBSEQ VISIT: HCPCS | Performed by: FAMILY MEDICINE

## 2024-08-22 PROCEDURE — 1170F FXNL STATUS ASSESSED: CPT | Performed by: FAMILY MEDICINE

## 2024-08-22 PROCEDURE — 1160F RVW MEDS BY RX/DR IN RCRD: CPT | Performed by: FAMILY MEDICINE

## 2024-08-22 PROCEDURE — 1159F MED LIST DOCD IN RCRD: CPT | Performed by: FAMILY MEDICINE

## 2024-08-22 PROCEDURE — 1126F AMNT PAIN NOTED NONE PRSNT: CPT | Performed by: FAMILY MEDICINE

## 2024-08-22 NOTE — PROGRESS NOTES
Subjective   The ABCs of the Annual Wellness Visit  Medicare Wellness Visit      Jay Guadalupe is a 64 y.o. patient who presents for a Medicare Wellness Visit.    The following portions of the patient's history were reviewed and   updated as appropriate: allergies, current medications, past family history, past medical history, past social history, past surgical history, and problem list.    Compared to one year ago, the patient's physical   health is better.  Compared to one year ago, the patient's mental   health is the same.    Recent Hospitalizations:  He was not admitted to the hospital during the last year.     Current Medical Providers:  Patient Care Team:  Carmel Simons DO as PCP - General (Family Medicine)  Aileen Powers PA-C as Physician Assistant (Physician Assistant)  Zaira Kerns MD as Consulting Physician (Cardiology)  Carmel Simons DO as Consulting Physician (Family Medicine)  Juan A Gonzales (Urology)    Outpatient Medications Prior to Visit   Medication Sig Dispense Refill    alfuzosin (UROXATRAL) 10 MG 24 hr tablet Take 1 tablet by mouth Daily.      aspirin 81 MG chewable tablet Chew 1 tablet Daily. 100 tablet 3    finasteride (PROSCAR) 5 MG tablet Daily.      nitroglycerin (NITROSTAT) 0.4 MG SL tablet Place 1 under the tongue as needed for chest pain, may repeat every 5 minutes for up three doses 100 tablet 1    omeprazole (priLOSEC) 20 MG capsule Take 1 capsule by mouth Daily. 90 capsule 1    rosuvastatin (CRESTOR) 20 MG tablet Take 1 tablet by mouth Every Night. 90 tablet 3    Vitamin A 3 MG (42565 UT) capsule Take 1 capsule by mouth Daily.      clobetasol propionate (TEMOVATE) 0.05 % cream Apply 1 Application topically to the appropriate area as directed 2 (Two) Times a Day. (Patient not taking: Reported on 8/22/2024) 30 g 0    vitamin D (ERGOCALCIFEROL) 1.25 MG (63879 UT) capsule capsule Take 1 capsule by mouth 1 (One) Time Per Week. (Patient not taking: Reported on 8/22/2024)  "12 capsule 0     No facility-administered medications prior to visit.     No opioid medication identified on active medication list. I have reviewed chart for other potential  high risk medication/s and harmful drug interactions in the elderly.      Aspirin is on active medication list. Aspirin use is indicated based on review of current medical condition/s. Pros and cons of this therapy have been discussed today. Benefits of this medication outweigh potential harm.  Patient has been encouraged to continue taking this medication.  .      Patient Active Problem List   Diagnosis    Prostatitis    Diastasis of rectus abdominis    STEMI (ST elevation myocardial infarction)    Dyslipidemia    Mixed hyperlipidemia    Coronary artery disease involving native coronary artery of native heart without angina pectoris    High prostate specific antigen (PSA)    Acute bilateral low back pain without sciatica    Retention of urine    Encounter for annual physical exam    Gastroesophageal reflux disease without esophagitis    Subconjunctival hemorrhage of right eye    Encounter for subsequent annual wellness visit (AWV) in Medicare patient     Advance Care Planning Advance Directive is not on file.  ACP discussion was held with the patient during this visit. Patient has an advance directive (not in EMR), copy requested.            Objective   Vitals:    08/22/24 1125   BP: 112/88   Pulse: 68   Temp: 98.2 °F (36.8 °C)   TempSrc: Infrared   Weight: 89.8 kg (198 lb)   Height: 177.8 cm (70\")       Estimated body mass index is 28.41 kg/m² as calculated from the following:    Height as of this encounter: 177.8 cm (70\").    Weight as of this encounter: 89.8 kg (198 lb).            Does the patient have evidence of cognitive impairment? No                                                                                                Health  Risk Assessment    Smoking Status:  Social History     Tobacco Use   Smoking Status Never    Passive " exposure: Never   Smokeless Tobacco Never     Alcohol Consumption:  Social History     Substance and Sexual Activity   Alcohol Use Yes    Alcohol/week: 8.0 standard drinks of alcohol    Types: 8 Glasses of wine per week    Comment: 10 glasses a week       Fall Risk Screen  STEADI Fall Risk Assessment was completed, and patient is at LOW risk for falls.Assessment completed on:2024    Depression Screenin/22/2024    11:28 AM   PHQ-2/PHQ-9 Depression Screening   Little Interest or Pleasure in Doing Things 0-->not at all   Feeling Down, Depressed or Hopeless 0-->not at all   PHQ-9: Brief Depression Severity Measure Score 0     Health Habits and Functional and Cognitive Screenin/22/2024    11:26 AM   Functional & Cognitive Status   Do you have difficulty preparing food and eating? No   Do you have difficulty bathing yourself, getting dressed or grooming yourself? No   Do you have difficulty using the toilet? No   Do you have difficulty moving around from place to place? No   Do you have trouble with steps or getting out of a bed or a chair? No   Current Diet Well Balanced Diet   Dental Exam Up to date   Eye Exam Up to date   Exercise (times per week) 6 times per week   Current Exercises Include Walking;Weightlifting   Do you need help using the phone?  No   Are you deaf or do you have serious difficulty hearing?  No   Do you need help to go to places out of walking distance? No   Do you need help shopping? No   Do you need help preparing meals?  No   Do you need help with housework?  No   Do you need help with laundry? No   Do you need help taking your medications? No   Do you need help managing money? No   Do you ever drive or ride in a car without wearing a seat belt? No   Have you felt unusual stress, anger or loneliness in the last month? No   Who do you live with? Spouse   If you need help, do you have trouble finding someone available to you? No   Have you been bothered in the last four weeks  by sexual problems? No   Do you have difficulty concentrating, remembering or making decisions? No           Age-appropriate Screening Schedule:  Refer to the list below for future screening recommendations based on patient's age, sex and/or medical conditions. Orders for these recommended tests are listed in the plan section. The patient has been provided with a written plan.    Health Maintenance List  Health Maintenance   Topic Date Due    HEPATITIS C SCREENING  Never done    INFLUENZA VACCINE  08/01/2024    ZOSTER VACCINE (1 of 2) 08/22/2024 (Originally 5/11/2008)    COVID-19 Vaccine (3 - 2023-24 season) 11/11/2024 (Originally 9/1/2023)    Pneumococcal Vaccine 65+ (1 of 1 - PCV) 08/22/2025 (Originally 5/11/2023)    BMI FOLLOWUP  03/08/2025    LIPID PANEL  04/01/2025    ANNUAL WELLNESS VISIT  08/22/2025    TDAP/TD VACCINES (2 - Td or Tdap) 12/05/2028    COLORECTAL CANCER SCREENING  01/11/2034                                                                                                                                                CMS Preventative Services Quick Reference  Risk Factors Identified During Encounter  Immunizations Discussed/Encouraged: Influenza and Prevnar 20 (Pneumococcal 20-valent conjugate)  Dental Screening Recommended  Vision Screening Recommended    The above risks/problems have been discussed with the patient.  Pertinent information has been shared with the patient in the After Visit Summary.  An After Visit Summary and PPPS were made available to the patient.    Follow Up:   Next Medicare Wellness visit to be scheduled in 1 year.         Additional E&M Note during same encounter follows:  Patient has additional, significant, and separately identifiable condition(s)/problem(s) that require work above and beyond the Medicare Wellness Visit     Chief Complaint  Medicare Wellness-subsequent    Subjective     Objective   Vital Signs:  /88   Pulse 68   Temp 98.2 °F (36.8 °C) (Infrared)   " Ht 177.8 cm (70\")   Wt 89.8 kg (198 lb)   BMI 28.41 kg/m²   Physical Exam    The following data was reviewed by: Carmel Simons DO on 08/22/2024:        Assessment and Plan Additional age appropriate preventative wellness advice topics were discussed during today's preventative wellness exam(some topics already addressed during AWV portion of the note above):    Physical Activity: Advised cardiovascular activity 150 minutes per week as tolerated. (example brisk walk for 30 minutes, 5 days a week).     Nutrition: Discussed nutrition plan with patient. Information shared in after visit summary. Goal is for a well balanced diet to enhance overall health.     Healthy Weight: Discussed current and goal BMI with patient. Steps to attain this goal discussed. Information shared in after visit summary.     Motor Vehicle Safety Discussion:  Wearing Seatbelt While in Motor Vehicle recommendation. Adhering to posted speed limit recommendation.     Sexual Behavior/Sexual Safety Discussion: Information shared in after visit summary.     Injury Prevention Discussion:  Information shared in after visit summary.         Encounter for subsequent annual wellness visit (AWV) in Medicare patient    Need for hepatitis C screening test    Vitamin D deficiency      Orders Placed This Encounter   Procedures    Hepatitis C Antibody     Standing Status:   Future     Standing Expiration Date:   8/22/2025     Order Specific Question:   Release to patient     Answer:   Routine Release [[id number]]    Comprehensive Metabolic Panel     Standing Status:   Future     Order Specific Question:   Release to patient     Answer:   Routine Release [[id number]]    TSH Rfx On Abnormal To Free T4     Standing Status:   Future     Order Specific Question:   Release to patient     Answer:   Routine Release [[id number]]    Vitamin D,25-Hydroxy     Standing Status:   Future     Order Specific Question:   Release to patient     Answer:   Routine Release " [[id number]]    Vitamin B12     Standing Status:   Future     Order Specific Question:   Release to patient     Answer:   Routine Release [[id number]]    CBC & Differential     Standing Status:   Future     Order Specific Question:   Manual Differential     Answer:   No     Order Specific Question:   Release to patient     Answer:   Routine Release [[id number]]             Follow Up   Return in about 6 months (around 2/22/2025) for followup HLD. .  Patient was given instructions and counseling regarding his condition or for health maintenance advice. Please see specific information pulled into the AVS if appropriate.      This document has been electronically signed by Carmel Simons DO   August 22, 2024 12:05 EDT    Dictated Utilizing Dragon Dictation: Part of this note may be an electronic transcription/translation of spoken language to printed text using the Dragon Dictation System.    Carmel Simons D.O.  [facility] [place]

## 2024-09-19 DIAGNOSIS — K21.9 GASTROESOPHAGEAL REFLUX DISEASE WITHOUT ESOPHAGITIS: ICD-10-CM

## 2024-09-26 ENCOUNTER — TELEPHONE (OUTPATIENT)
Dept: FAMILY MEDICINE CLINIC | Facility: CLINIC | Age: 64
End: 2024-09-26
Payer: MEDICARE

## 2024-10-14 ENCOUNTER — TRANSCRIBE ORDERS (OUTPATIENT)
Dept: LAB | Facility: HOSPITAL | Age: 64
End: 2024-10-14
Payer: MEDICARE

## 2024-10-14 ENCOUNTER — LAB (OUTPATIENT)
Dept: LAB | Facility: HOSPITAL | Age: 64
End: 2024-10-14
Payer: MEDICARE

## 2024-10-14 DIAGNOSIS — Z11.59 NEED FOR HEPATITIS C SCREENING TEST: ICD-10-CM

## 2024-10-14 DIAGNOSIS — R97.20 ELEVATED PSA: Primary | ICD-10-CM

## 2024-10-14 DIAGNOSIS — Z00.00 ENCOUNTER FOR SUBSEQUENT ANNUAL WELLNESS VISIT (AWV) IN MEDICARE PATIENT: ICD-10-CM

## 2024-10-14 DIAGNOSIS — R97.20 ELEVATED PSA: ICD-10-CM

## 2024-10-14 DIAGNOSIS — E55.9 VITAMIN D DEFICIENCY: ICD-10-CM

## 2024-10-14 LAB
25(OH)D3 SERPL-MCNC: 28 NG/ML (ref 30–100)
ALBUMIN SERPL-MCNC: 4.2 G/DL (ref 3.5–5.2)
ALBUMIN/GLOB SERPL: 1.6 G/DL
ALP SERPL-CCNC: 68 U/L (ref 39–117)
ALT SERPL W P-5'-P-CCNC: 20 U/L (ref 1–41)
ANION GAP SERPL CALCULATED.3IONS-SCNC: 8.6 MMOL/L (ref 5–15)
AST SERPL-CCNC: 22 U/L (ref 1–40)
BASOPHILS # BLD AUTO: 0.02 10*3/MM3 (ref 0–0.2)
BASOPHILS NFR BLD AUTO: 0.5 % (ref 0–1.5)
BILIRUB SERPL-MCNC: 0.5 MG/DL (ref 0–1.2)
BUN SERPL-MCNC: 15 MG/DL (ref 8–23)
BUN/CREAT SERPL: 14.9 (ref 7–25)
CALCIUM SPEC-SCNC: 9.1 MG/DL (ref 8.6–10.5)
CHLORIDE SERPL-SCNC: 105 MMOL/L (ref 98–107)
CO2 SERPL-SCNC: 25.4 MMOL/L (ref 22–29)
CREAT SERPL-MCNC: 1.01 MG/DL (ref 0.76–1.27)
DEPRECATED RDW RBC AUTO: 40.9 FL (ref 37–54)
EGFRCR SERPLBLD CKD-EPI 2021: 82 ML/MIN/1.73
EOSINOPHIL # BLD AUTO: 0.06 10*3/MM3 (ref 0–0.4)
EOSINOPHIL NFR BLD AUTO: 1.4 % (ref 0.3–6.2)
ERYTHROCYTE [DISTWIDTH] IN BLOOD BY AUTOMATED COUNT: 11.8 % (ref 12.3–15.4)
GLOBULIN UR ELPH-MCNC: 2.6 GM/DL
GLUCOSE SERPL-MCNC: 84 MG/DL (ref 65–99)
HCT VFR BLD AUTO: 41.4 % (ref 37.5–51)
HCV AB SER QL: NORMAL
HGB BLD-MCNC: 14.4 G/DL (ref 13–17.7)
IMM GRANULOCYTES # BLD AUTO: 0.01 10*3/MM3 (ref 0–0.05)
IMM GRANULOCYTES NFR BLD AUTO: 0.2 % (ref 0–0.5)
LYMPHOCYTES # BLD AUTO: 1.31 10*3/MM3 (ref 0.7–3.1)
LYMPHOCYTES NFR BLD AUTO: 30.8 % (ref 19.6–45.3)
MCH RBC QN AUTO: 33 PG (ref 26.6–33)
MCHC RBC AUTO-ENTMCNC: 34.8 G/DL (ref 31.5–35.7)
MCV RBC AUTO: 95 FL (ref 79–97)
MONOCYTES # BLD AUTO: 0.44 10*3/MM3 (ref 0.1–0.9)
MONOCYTES NFR BLD AUTO: 10.4 % (ref 5–12)
NEUTROPHILS NFR BLD AUTO: 2.41 10*3/MM3 (ref 1.7–7)
NEUTROPHILS NFR BLD AUTO: 56.7 % (ref 42.7–76)
NRBC BLD AUTO-RTO: 0 /100 WBC (ref 0–0.2)
PLATELET # BLD AUTO: 200 10*3/MM3 (ref 140–450)
PMV BLD AUTO: 10 FL (ref 6–12)
POTASSIUM SERPL-SCNC: 4.4 MMOL/L (ref 3.5–5.2)
PROT SERPL-MCNC: 6.8 G/DL (ref 6–8.5)
PSA SERPL-MCNC: 6.98 NG/ML (ref 0–4)
RBC # BLD AUTO: 4.36 10*6/MM3 (ref 4.14–5.8)
SODIUM SERPL-SCNC: 139 MMOL/L (ref 136–145)
TSH SERPL DL<=0.05 MIU/L-ACNC: 2.03 UIU/ML (ref 0.27–4.2)
VIT B12 BLD-MCNC: 415 PG/ML (ref 211–946)
WBC NRBC COR # BLD AUTO: 4.25 10*3/MM3 (ref 3.4–10.8)

## 2024-10-14 PROCEDURE — 86803 HEPATITIS C AB TEST: CPT

## 2024-10-14 PROCEDURE — 85025 COMPLETE CBC W/AUTO DIFF WBC: CPT

## 2024-10-14 PROCEDURE — 84153 ASSAY OF PSA TOTAL: CPT

## 2024-10-14 PROCEDURE — 80053 COMPREHEN METABOLIC PANEL: CPT

## 2024-10-14 PROCEDURE — 36415 COLL VENOUS BLD VENIPUNCTURE: CPT

## 2024-10-14 PROCEDURE — 82607 VITAMIN B-12: CPT

## 2024-10-14 PROCEDURE — 82306 VITAMIN D 25 HYDROXY: CPT

## 2024-10-14 PROCEDURE — 84443 ASSAY THYROID STIM HORMONE: CPT

## 2024-11-06 RX ORDER — ROSUVASTATIN CALCIUM 20 MG/1
20 TABLET, COATED ORAL
Qty: 90 TABLET | Refills: 3 | Status: SHIPPED | OUTPATIENT
Start: 2024-11-06

## 2024-12-06 ENCOUNTER — OFFICE VISIT (OUTPATIENT)
Dept: CARDIOLOGY | Facility: CLINIC | Age: 64
End: 2024-12-06
Payer: MEDICARE

## 2024-12-06 VITALS
HEIGHT: 70 IN | OXYGEN SATURATION: 95 % | DIASTOLIC BLOOD PRESSURE: 60 MMHG | BODY MASS INDEX: 29.03 KG/M2 | HEART RATE: 84 BPM | WEIGHT: 202.8 LBS | SYSTOLIC BLOOD PRESSURE: 104 MMHG

## 2024-12-06 DIAGNOSIS — E78.5 DYSLIPIDEMIA: ICD-10-CM

## 2024-12-06 DIAGNOSIS — M72.2 PLANTAR FASCIITIS: ICD-10-CM

## 2024-12-06 DIAGNOSIS — I25.10 CORONARY ARTERY DISEASE INVOLVING NATIVE CORONARY ARTERY OF NATIVE HEART WITHOUT ANGINA PECTORIS: Primary | ICD-10-CM

## 2024-12-06 PROCEDURE — 1160F RVW MEDS BY RX/DR IN RCRD: CPT | Performed by: INTERNAL MEDICINE

## 2024-12-06 PROCEDURE — 1159F MED LIST DOCD IN RCRD: CPT | Performed by: INTERNAL MEDICINE

## 2024-12-06 PROCEDURE — 99214 OFFICE O/P EST MOD 30 MIN: CPT | Performed by: INTERNAL MEDICINE

## 2024-12-06 RX ORDER — ROSUVASTATIN CALCIUM 20 MG/1
20 TABLET, COATED ORAL
Qty: 90 TABLET | Refills: 3 | Status: SHIPPED | OUTPATIENT
Start: 2024-12-06

## 2024-12-06 RX ORDER — EZETIMIBE 10 MG/1
10 TABLET ORAL DAILY
Qty: 90 TABLET | Refills: 3 | Status: SHIPPED | OUTPATIENT
Start: 2024-12-06

## 2024-12-06 NOTE — PROGRESS NOTES
[facility] Cardiology    Encounter Date: 2024    Patient ID: Jay Guadalupe is a 64 y.o. male.  : 1960     PCP: Carmel Simons DO       Chief Complaint: Coronary artery disease involving native coronary artery of      PROBLEM LIST:  Coronary artery disease  STEMI, 2021  [facility], 2021: EF 60%. 100% occlusion of infarct-related mid left circumflex coronary artery. Successful thrombectomy followed by PTCA and placement of 3.0 x 23 mm BENITEZ to mid circumflex reducing 100% stenosis to 0%. 90% stenosis of the distal LAD successfully stented with 2.5 x 23 mm BENITEZ postdilated to 3.0 mm and reduced to 0%. Residual mild nonobstructive plaque of the proximal LAD, moderate nonobstructive plaque of the mid RCA and 60 to 70% stenosis of the distal RCA. Mild high lateral hypokinesis.   Echo, 2021: EF 55%. Mild concentric LVH. Mild MR. Trace TR with normal RVSP.   Stress test 2023: Myocardial perfusion imaging indicates a moderate size infarct of the basal inferior lateral wall with no significant ischemia  Dyslipidemia        History of Present Illness  Patient presents today for a follow-up with a history of CAD and cardiac risk factors. Since last visit, patient has been doing well overall from a cardiovascular standpoint. He stays busy and active taking care of and playing with his grandchildren and regular walking also helps out friends and family.  Patient reports that he has not been taking his statins regularly as he is concerned about side effects.  States that he will try to take it more consistently. Patient denies chest pain, shortness of breath, orthopnea, palpitations, edema, dizziness, and syncope.       No Known Allergies      Current Outpatient Medications:     alfuzosin (UROXATRAL) 10 MG 24 hr tablet, Take 1 tablet by mouth Daily., Disp: , Rfl:     aspirin 81 MG chewable tablet, Chew 1 tablet Daily., Disp: 100 tablet, Rfl: 3    finasteride (PROSCAR) 5 MG  "tablet, Daily., Disp: , Rfl:     nitroglycerin (NITROSTAT) 0.4 MG SL tablet, Place 1 under the tongue as needed for chest pain, may repeat every 5 minutes for up three doses, Disp: 100 tablet, Rfl: 1    omeprazole (priLOSEC) 20 MG capsule, Take 1 capsule by mouth Daily., Disp: 90 capsule, Rfl: 0    rosuvastatin (CRESTOR) 20 MG tablet, Take 1 tablet by mouth every night at bedtime., Disp: 90 tablet, Rfl: 3    Vitamin A 3 MG (92160 UT) capsule, Take 1 capsule by mouth Daily., Disp: , Rfl:     ezetimibe (ZETIA) 10 MG tablet, Take 1 tablet by mouth Daily., Disp: 90 tablet, Rfl: 3    The following portions of the patient's history were reviewed and updated as appropriate: allergies, current medications, past family history, past medical history, past social history, past surgical history and problem list.    ROS  Review of Systems   14 point ROS negative except for that listed in the HPI.         Objective:     /60 (BP Location: Right arm, Patient Position: Sitting, Cuff Size: Adult)   Pulse 84   Ht 177.8 cm (70\")   Wt 92 kg (202 lb 12.8 oz)   SpO2 95%   BMI 29.10 kg/m²      Physical Exam  General: No apparent distress.  Neck: no JVD.  Chest:No respiratory distress, breath sounds are normal. No wheezes,  rhonchi or rales.  Cardiovascular: Normal S1 and S2, no murmur, gallop or rub.    Extremities: No edema.      Data Review:   Lab Results   Component Value Date    GLUCOSE 84 10/14/2024    BUN 15 10/14/2024    CREATININE 1.01 10/14/2024    EGFR 82.0 10/14/2024    BCR 14.9 10/14/2024     10/14/2024    K 4.4 10/14/2024    CO2 25.4 10/14/2024    CALCIUM 9.1 10/14/2024    ALBUMIN 4.2 10/14/2024    AST 22 10/14/2024    ALT 20 10/14/2024     Lab Results   Component Value Date    CHOL 156 04/01/2024    TRIG 121 04/01/2024    HDL 38 (L) 04/01/2024    LDL 96 04/01/2024      Lab Results   Component Value Date    WBC 4.25 10/14/2024    RBC 4.36 10/14/2024    HGB 14.4 10/14/2024    HCT 41.4 10/14/2024    MCV 95.0 " 10/14/2024     10/14/2024     Lab Results   Component Value Date    TSH 2.030 10/14/2024       Procedures       Advance Care Planning   ACP discussion was declined by the patient. Patient does not have an advance directive, declines further assistance.           Assessment:      Diagnosis Plan   1. Coronary artery disease involving native coronary artery of native heart without angina pectoris  Stable and asymptomatic. Continue on aspirin 81 mg for antiplatelet therapy.         2. Dyslipidemia  Elevated. Start on Zetia 10 mg daily for dyslipidemia. Continue on rosuvastatin 20 mg daily for dyslipidemia and take it regularly.  Reassess lipid panel in     Plan:   Stable cardiac status.  No angina or CHF symptoms.  Start on Zetia 10 mg daily for dyslipidemia.   Continue current medications and start taking Crestor regularly.   Recheck lipid panel and CMP around Gaitan's Day and consider optimizing management depending on results.  Continue heart healthy diet and exercise.  FU in 6 MO, sooner as needed.  Thank you for allowing us to participate in the care of your patient.     Scribed for Zaira Kerns MD by Lucian Washington.    I, Zaira Kerns MD, personally performed the services described in this documentation as scribed by the above named individual in my presence, and it is both accurate and complete.  12/6/2024  14:57 EST      Please note that portions of this note may have been completed with a voice recognition program. Efforts were made to edit the dictations, but occasionally words are mistranscribed.

## 2024-12-26 DIAGNOSIS — K21.9 GASTROESOPHAGEAL REFLUX DISEASE WITHOUT ESOPHAGITIS: ICD-10-CM

## 2025-02-17 ENCOUNTER — LAB (OUTPATIENT)
Dept: LAB | Facility: HOSPITAL | Age: 65
End: 2025-02-17
Payer: MEDICARE

## 2025-02-17 ENCOUNTER — OFFICE VISIT (OUTPATIENT)
Dept: FAMILY MEDICINE CLINIC | Facility: CLINIC | Age: 65
End: 2025-02-17
Payer: MEDICARE

## 2025-02-17 VITALS
BODY MASS INDEX: 29.2 KG/M2 | HEIGHT: 70 IN | HEART RATE: 74 BPM | TEMPERATURE: 98 F | WEIGHT: 204 LBS | DIASTOLIC BLOOD PRESSURE: 80 MMHG | SYSTOLIC BLOOD PRESSURE: 112 MMHG

## 2025-02-17 DIAGNOSIS — R97.20 HIGH PROSTATE SPECIFIC ANTIGEN (PSA): ICD-10-CM

## 2025-02-17 DIAGNOSIS — M72.2 PLANTAR FASCIITIS: ICD-10-CM

## 2025-02-17 DIAGNOSIS — K21.9 GASTROESOPHAGEAL REFLUX DISEASE WITHOUT ESOPHAGITIS: ICD-10-CM

## 2025-02-17 DIAGNOSIS — E78.2 MIXED HYPERLIPIDEMIA: Primary | ICD-10-CM

## 2025-02-17 DIAGNOSIS — I25.10 CORONARY ARTERY DISEASE INVOLVING NATIVE CORONARY ARTERY OF NATIVE HEART WITHOUT ANGINA PECTORIS: ICD-10-CM

## 2025-02-17 DIAGNOSIS — Z12.11 ENCOUNTER FOR SCREENING FOR MALIGNANT NEOPLASM OF COLON: ICD-10-CM

## 2025-02-17 DIAGNOSIS — E78.2 MIXED HYPERLIPIDEMIA: ICD-10-CM

## 2025-02-17 DIAGNOSIS — E55.9 VITAMIN D DEFICIENCY: ICD-10-CM

## 2025-02-17 LAB
25(OH)D3 SERPL-MCNC: 30.1 NG/ML (ref 30–100)
ALBUMIN SERPL-MCNC: 4.1 G/DL (ref 3.5–5.2)
ALBUMIN/GLOB SERPL: 1.2 G/DL
ALP SERPL-CCNC: 62 U/L (ref 39–117)
ALT SERPL W P-5'-P-CCNC: 30 U/L (ref 1–41)
ANION GAP SERPL CALCULATED.3IONS-SCNC: 11.8 MMOL/L (ref 5–15)
AST SERPL-CCNC: 30 U/L (ref 1–40)
BILIRUB SERPL-MCNC: 0.5 MG/DL (ref 0–1.2)
BUN SERPL-MCNC: 18 MG/DL (ref 8–23)
BUN/CREAT SERPL: 15.1 (ref 7–25)
CALCIUM SPEC-SCNC: 9.4 MG/DL (ref 8.6–10.5)
CHLORIDE SERPL-SCNC: 107 MMOL/L (ref 98–107)
CHOLEST SERPL-MCNC: 146 MG/DL (ref 0–200)
CO2 SERPL-SCNC: 23.2 MMOL/L (ref 22–29)
CREAT SERPL-MCNC: 1.19 MG/DL (ref 0.76–1.27)
EGFRCR SERPLBLD CKD-EPI 2021: 67.4 ML/MIN/1.73
GLOBULIN UR ELPH-MCNC: 3.5 GM/DL
GLUCOSE SERPL-MCNC: 89 MG/DL (ref 65–99)
HDLC SERPL-MCNC: 43 MG/DL (ref 40–60)
LDLC SERPL CALC-MCNC: 76 MG/DL (ref 0–100)
LDLC/HDLC SERPL: 1.68 {RATIO}
POTASSIUM SERPL-SCNC: 4.4 MMOL/L (ref 3.5–5.2)
PROT SERPL-MCNC: 7.6 G/DL (ref 6–8.5)
PSA SERPL-MCNC: 7.32 NG/ML (ref 0–4)
SODIUM SERPL-SCNC: 142 MMOL/L (ref 136–145)
TRIGL SERPL-MCNC: 154 MG/DL (ref 0–150)
VLDLC SERPL-MCNC: 27 MG/DL (ref 5–40)

## 2025-02-17 PROCEDURE — 84153 ASSAY OF PSA TOTAL: CPT

## 2025-02-17 PROCEDURE — 99214 OFFICE O/P EST MOD 30 MIN: CPT | Performed by: FAMILY MEDICINE

## 2025-02-17 PROCEDURE — 1126F AMNT PAIN NOTED NONE PRSNT: CPT | Performed by: FAMILY MEDICINE

## 2025-02-17 PROCEDURE — 36415 COLL VENOUS BLD VENIPUNCTURE: CPT

## 2025-02-17 PROCEDURE — 80053 COMPREHEN METABOLIC PANEL: CPT

## 2025-02-17 PROCEDURE — 1159F MED LIST DOCD IN RCRD: CPT | Performed by: FAMILY MEDICINE

## 2025-02-17 PROCEDURE — 82306 VITAMIN D 25 HYDROXY: CPT

## 2025-02-17 PROCEDURE — 80061 LIPID PANEL: CPT

## 2025-02-17 PROCEDURE — 1160F RVW MEDS BY RX/DR IN RCRD: CPT | Performed by: FAMILY MEDICINE

## 2025-02-17 NOTE — PROGRESS NOTES
Subjective     Chief Complaint  Hyperlipidemia    Subjective          Jay Guadalupe is a 64 y.o. male who presents today to [facility] for follow up.    HPI:   History of Present Illness    Mr. Guadalupe is a very pleasant 64-year-old male who presents today to follow up on chronic conditions.      He has a past medical history of CAD, Hyperlipidemia, elevated PSA,  plantar fascitis.     For hyperlipidemia, he is prescribed Crestor 20 mg daily and Zetia 10 mg daily.  His last lipids were 4/2024 and were well-controlled.  He takes aspirin 81 mg daily.    For elevated PSA, he is prescribed Proscar and alfuzosin daily. He follows with Urology.     GERD -currently prescribed Prilosec 20 mg daily.    He has had issues with plantar fascitis for the last two years, he has followed with Podiatry and PT and still has a lot of pain specifically on the medial aspect of his food.     The following portions of the patient's history were reviewed and updated as appropriate: allergies, current medications, past family history, past medical history, past social history, past surgical history and problem list.    Objective     Objective     Allergy:   No Known Allergies     Current Medications:   Current Outpatient Medications   Medication Sig Dispense Refill    alfuzosin (UROXATRAL) 10 MG 24 hr tablet Take 1 tablet by mouth Daily.      aspirin 81 MG chewable tablet Chew 1 tablet Daily. 100 tablet 3    ezetimibe (ZETIA) 10 MG tablet Take 1 tablet by mouth Daily. 90 tablet 3    finasteride (PROSCAR) 5 MG tablet Daily.      nitroglycerin (NITROSTAT) 0.4 MG SL tablet Place 1 under the tongue as needed for chest pain, may repeat every 5 minutes for up three doses 100 tablet 1    omeprazole (priLOSEC) 20 MG capsule TAKE 1 CAPSULE BY MOUTH EVERY DAY 90 capsule 0    rosuvastatin (CRESTOR) 20 MG tablet Take 1 tablet by mouth every night at bedtime. 90 tablet 3    Vitamin A 3 MG (14089 UT) capsule Take 1  capsule by mouth Daily.      diclofenac (VOLTAREN) 50 MG EC tablet Take 1 tablet by mouth 2 (Two) Times a Day. 60 tablet 2     No current facility-administered medications for this visit.       Past Medical History:  Past Medical History:   Diagnosis Date    Benign prostatic hyperplasia     Enlarged prostate     GERD (gastroesophageal reflux disease)     Hip arthrosis     Low back strain 2019    Better now    Myocardial infarction 2021    Rotator cuff syndrome     Surgery to repair left shoulder complete tear    Visual impairment     Genitic RP (Retinitis Pigmentosa)       Past Surgical History:  Past Surgical History:   Procedure Laterality Date    CARDIAC CATHETERIZATION N/A 2021    Procedure: Left Heart Cath;  Surgeon: Zaira Kerns MD;  Location: [facility] CATH INVASIVE LOC[facility];  Service: Cardiovascular;  Laterality: N/A;    COLONOSCOPY      KNEE SURGERY  Oct. 2021    SHOULDER SURGERY         Social History:  Social History     Socioeconomic History    Marital status:    Tobacco Use    Smoking status: Never     Passive exposure: Never    Smokeless tobacco: Never   Vaping Use    Vaping status: Never Used   Substance and Sexual Activity    Alcohol use: Yes     Alcohol/week: 8.0 standard drinks of alcohol     Types: 8 Glasses of wine per week     Comment: 10 glasses a week    Drug use: No    Sexual activity: Not Currently     Partners: Female       Family History:  Family History   Problem Relation Age of Onset    Leukemia Mother     Fainting Mother     Cancer Mother          from Leukemia in     Aortic stenosis Father     Rheum arthritis Sister     Cancer Sister     No Known Problems Brother     No Known Problems Maternal Grandmother     No Known Problems Maternal Grandfather     No Known Problems Paternal Grandmother     No Known Problems Paternal Grandfather     No Known Problems Sister          Vital Signs:   /80   Pulse 74   Temp 98 °F (36.7 °C)  "(Infrared)   Ht 177.8 cm (70\")   Wt 92.5 kg (204 lb)   BMI 29.27 kg/m²      Physical Exam:  Physical Exam  Vitals reviewed.   Constitutional:       Appearance: He is not ill-appearing.   Eyes:      Pupils: Pupils are equal, round, and reactive to light.   Cardiovascular:      Rate and Rhythm: Normal rate.      Pulses: Normal pulses.   Pulmonary:      Effort: Pulmonary effort is normal.      Breath sounds: Normal breath sounds.   Neurological:      General: No focal deficit present.      Mental Status: He is alert. Mental status is at baseline.   Psychiatric:         Mood and Affect: Mood normal.         Behavior: Behavior normal.         Thought Content: Thought content normal.         Judgment: Judgment normal.             Lab Review  No visits with results within 3 Month(s) from this visit.   Latest known visit with results is:   Lab on 10/14/2024   Component Date Value Ref Range Status    Hepatitis C Ab 10/14/2024 Non-Reactive  Non-Reactive Final    Glucose 10/14/2024 84  65 - 99 mg/dL Final    BUN 10/14/2024 15  8 - 23 mg/dL Final    Creatinine 10/14/2024 1.01  0.76 - 1.27 mg/dL Final    Sodium 10/14/2024 139  136 - 145 mmol/L Final    Potassium 10/14/2024 4.4  3.5 - 5.2 mmol/L Final    Chloride 10/14/2024 105  98 - 107 mmol/L Final    CO2 10/14/2024 25.4  22.0 - 29.0 mmol/L Final    Calcium 10/14/2024 9.1  8.6 - 10.5 mg/dL Final    Total Protein 10/14/2024 6.8  6.0 - 8.5 g/dL Final    Albumin 10/14/2024 4.2  3.5 - 5.2 g/dL Final    ALT (SGPT) 10/14/2024 20  1 - 41 U/L Final    AST (SGOT) 10/14/2024 22  1 - 40 U/L Final    Alkaline Phosphatase 10/14/2024 68  39 - 117 U/L Final    Total Bilirubin 10/14/2024 0.5  0.0 - 1.2 mg/dL Final    Globulin 10/14/2024 2.6  gm/dL Final    A/G Ratio 10/14/2024 1.6  g/dL Final    BUN/Creatinine Ratio 10/14/2024 14.9  7.0 - 25.0 Final    Anion Gap 10/14/2024 8.6  5.0 - 15.0 mmol/L Final    eGFR 10/14/2024 82.0  >60.0 mL/min/1.73 Final    TSH 10/14/2024 2.030  0.270 - 4.200 " uIU/mL Final    25 Hydroxy, Vitamin D 10/14/2024 28.0 (L)  30.0 - 100.0 ng/ml Final    Vitamin B-12 10/14/2024 415  211 - 946 pg/mL Final    PSA 10/14/2024 6.980 (H)  0.000 - 4.000 ng/mL Final    WBC 10/14/2024 4.25  3.40 - 10.80 10*3/mm3 Final    RBC 10/14/2024 4.36  4.14 - 5.80 10*6/mm3 Final    Hemoglobin 10/14/2024 14.4  13.0 - 17.7 g/dL Final    Hematocrit 10/14/2024 41.4  37.5 - 51.0 % Final    MCV 10/14/2024 95.0  79.0 - 97.0 fL Final    MCH 10/14/2024 33.0  26.6 - 33.0 pg Final    MCHC 10/14/2024 34.8  31.5 - 35.7 g/dL Final    RDW 10/14/2024 11.8 (L)  12.3 - 15.4 % Final    RDW-SD 10/14/2024 40.9  37.0 - 54.0 fl Final    MPV 10/14/2024 10.0  6.0 - 12.0 fL Final    Platelets 10/14/2024 200  140 - 450 10*3/mm3 Final    Neutrophil % 10/14/2024 56.7  42.7 - 76.0 % Final    Lymphocyte % 10/14/2024 30.8  19.6 - 45.3 % Final    Monocyte % 10/14/2024 10.4  5.0 - 12.0 % Final    Eosinophil % 10/14/2024 1.4  0.3 - 6.2 % Final    Basophil % 10/14/2024 0.5  0.0 - 1.5 % Final    Immature Grans % 10/14/2024 0.2  0.0 - 0.5 % Final    Neutrophils, Absolute 10/14/2024 2.41  1.70 - 7.00 10*3/mm3 Final    Lymphocytes, Absolute 10/14/2024 1.31  0.70 - 3.10 10*3/mm3 Final    Monocytes, Absolute 10/14/2024 0.44  0.10 - 0.90 10*3/mm3 Final    Eosinophils, Absolute 10/14/2024 0.06  0.00 - 0.40 10*3/mm3 Final    Basophils, Absolute 10/14/2024 0.02  0.00 - 0.20 10*3/mm3 Final    Immature Grans, Absolute 10/14/2024 0.01  0.00 - 0.05 10*3/mm3 Final    nRBC 10/14/2024 0.0  0.0 - 0.2 /100 WBC Final        Radiology Results        Assessment / Plan         Assessment and Plan   Diagnoses and all orders for this visit:    1. Mixed hyperlipidemia (Primary)  Assessment & Plan:   Lipid abnormalities are improving with treatment    Plan:  Continue same medication/s without change.      Discussed medication dosage, use, side effects, and goals of treatment in detail.    Counseled patient on lifestyle modifications to help control  hyperlipidemia.     Patient Treatment Goals:   LDL goal is less than 70    Followup in 6 months.    Orders:  -     Comprehensive Metabolic Panel; Future  -     Cancel: Lipid Panel; Future    2. Gastroesophageal reflux disease without esophagitis  Assessment & Plan:  Improved with Omeprazole. Refill sent     Orders:  -     Comprehensive Metabolic Panel; Future    3. Vitamin D deficiency  -     Vitamin D,25-Hydroxy; Future    4. Encounter for screening for malignant neoplasm of colon  -     Ambulatory Referral For Screening Colonoscopy    5. High prostate specific antigen (PSA)  -     PSA DIAGNOSTIC ONLY; Future    6. Plantar fasciitis  Assessment & Plan:  - continue follow ups with Podiatry   - Rx Diclofenac to take on an as needed basis     Orders:  -     diclofenac (VOLTAREN) 50 MG EC tablet; Take 1 tablet by mouth 2 (Two) Times a Day.  Dispense: 60 tablet; Refill: 2        Discussed possible differential diagnoses, testing, treatment, recommended non-pharmacological interventions, risks, warning signs to monitor for that would indicate need for follow-up in clinic or ER. If no improvement with these regimens or you have new or worsening symptoms follow-up. Patient verbalizes understanding and agreement with plan of care. Denies further needs or concerns.     Patient was given instructions and counseling regarding her condition and for health maintenance advised.      Health Maintenance  Health Maintenance:   Health Maintenance Due   Topic Date Due    COLORECTAL CANCER SCREENING  01/11/2025        Meds ordered during this visit  New Medications Ordered This Visit   Medications    diclofenac (VOLTAREN) 50 MG EC tablet     Sig: Take 1 tablet by mouth 2 (Two) Times a Day.     Dispense:  60 tablet     Refill:  2       Meds stopped during this visit:  There are no discontinued medications.     Visit Diagnoses    ICD-10-CM ICD-9-CM   1. Mixed hyperlipidemia  E78.2 272.2   2. Gastroesophageal reflux disease without  esophagitis  K21.9 530.81   3. Vitamin D deficiency  E55.9 268.9   4. Encounter for screening for malignant neoplasm of colon  Z12.11 V76.51   5. High prostate specific antigen (PSA)  R97.20 790.93   6. Plantar fasciitis  M72.2 728.71       Patient was given instructions and counseling regarding his condition or for health maintenance advice. Please see specific information pulled into the AVS if appropriate.     Follow Up   No follow-ups on file.          This document has been electronically signed by Carmel Simons DO   February 17, 2025 11:50 EST    Dictated Utilizing Dragon Dictation: Part of this note may be an electronic transcription/translation of spoken language to printed text using the Dragon Dictation System.    Carmel Simons D.O.  [facility] [place]

## 2025-04-23 ENCOUNTER — TELEPHONE (OUTPATIENT)
Dept: GASTROENTEROLOGY | Facility: CLINIC | Age: 65
End: 2025-04-23
Payer: MEDICARE

## 2025-04-23 NOTE — TELEPHONE ENCOUNTER
Patient has a colon with Dr. Cardoso scheduled on 05/05. Went to  prep from pharmacy and copay is high. Wants to substitute for golytely..  [facility] needs verbal authorization to substitute.

## 2025-05-05 ENCOUNTER — OUTSIDE FACILITY SERVICE (OUTPATIENT)
Dept: GASTROENTEROLOGY | Facility: CLINIC | Age: 65
End: 2025-05-05
Payer: MEDICARE

## 2025-05-05 PROCEDURE — G0121 COLON CA SCRN NOT HI RSK IND: HCPCS | Performed by: INTERNAL MEDICINE

## 2025-05-06 ENCOUNTER — TELEPHONE (OUTPATIENT)
Dept: GASTROENTEROLOGY | Facility: CLINIC | Age: 65
End: 2025-05-06

## 2025-05-06 ENCOUNTER — OFFICE VISIT (OUTPATIENT)
Dept: FAMILY MEDICINE CLINIC | Facility: CLINIC | Age: 65
End: 2025-05-06
Payer: MEDICARE

## 2025-05-06 ENCOUNTER — HOSPITAL ENCOUNTER (OUTPATIENT)
Dept: GENERAL RADIOLOGY | Facility: HOSPITAL | Age: 65
Discharge: HOME | End: 2025-05-06
Admitting: FAMILY MEDICINE
Payer: MEDICARE

## 2025-05-06 VITALS
WEIGHT: 200.6 LBS | TEMPERATURE: 98.6 F | HEIGHT: 70 IN | DIASTOLIC BLOOD PRESSURE: 68 MMHG | BODY MASS INDEX: 28.72 KG/M2 | HEART RATE: 63 BPM | SYSTOLIC BLOOD PRESSURE: 122 MMHG

## 2025-05-06 DIAGNOSIS — J69.0 ASPIRATION PNEUMONIA DUE TO GASTRIC SECRETIONS, UNSPECIFIED LATERALITY, UNSPECIFIED PART OF LUNG: ICD-10-CM

## 2025-05-06 DIAGNOSIS — J69.0 ASPIRATION PNEUMONIA DUE TO GASTRIC SECRETIONS, UNSPECIFIED LATERALITY, UNSPECIFIED PART OF LUNG: Primary | ICD-10-CM

## 2025-05-06 PROCEDURE — 71046 X-RAY EXAM CHEST 2 VIEWS: CPT

## 2025-05-06 RX ORDER — LIDOCAINE HYDROCHLORIDE 20 MG/ML
SOLUTION OROPHARYNGEAL
COMMUNITY
Start: 2025-05-06

## 2025-05-06 RX ORDER — LEVOFLOXACIN 750 MG/1
750 TABLET, FILM COATED ORAL DAILY
Qty: 7 TABLET | Refills: 0 | Status: SHIPPED | OUTPATIENT
Start: 2025-05-06

## 2025-05-06 NOTE — PROGRESS NOTES
Subjective     Chief Complaint  Colonoscopy (Had secretions come up after and had to be suctioned out. Still coughing up green mucus, throat and tongue are sore, loss of voice)    Subjective          Jay Guadalupe is a 64 y.o. male who presents today to [facility] for follow up.    HPI:   History of Present Illness    History of Present Illness  The patient presents for evaluation of a suspected aspiration event.    He underwent a colonoscopy on 05/05/2025, which yielded satisfactory results. However, during the procedure, he experienced an episode of reflux, leading to persistent coughing and expectoration of thick, dark mucus. This episode resulted in the saturation of his pillow and face, necessitating suctioning. Upon awakening from sleep on 05/06/2025, he reported a burning sensation in his mouth, tongue pain, and facial discomfort. He had a low-grade fever last night, accompanied by chills, for which he was administered Tylenol and Advil. His appetite was slightly diminished. He continues to experience a sore throat, which is tender to touch. His pulse oximetry reading was 95%. He has been hesitant to take omeprazole due to a general aversion to medication. He had a colonoscopy last year without any complications.      The following portions of the patient's history were reviewed and updated as appropriate: allergies, current medications, past family history, past medical history, past social history, past surgical history and problem list.    Objective     Objective     Allergy:   No Known Allergies     Current Medications:   Current Outpatient Medications   Medication Sig Dispense Refill    alfuzosin (UROXATRAL) 10 MG 24 hr tablet Take 1 tablet by mouth Daily.      aspirin 81 MG chewable tablet Chew 1 tablet Daily. 100 tablet 3    ezetimibe (ZETIA) 10 MG tablet Take 1 tablet by mouth Daily. 90 tablet 3    finasteride (PROSCAR) 5 MG tablet Daily.      Lidocaine Viscous  HCl (XYLOCAINE) 2 % solution       nitroglycerin (NITROSTAT) 0.4 MG SL tablet Place 1 under the tongue as needed for chest pain, may repeat every 5 minutes for up three doses 100 tablet 1    omeprazole (priLOSEC) 20 MG capsule TAKE 1 CAPSULE BY MOUTH EVERY DAY 90 capsule 0    polyethylene glycol (GoLYTELY) 236 g solution AS DIRECTED 4000 mL 0    rosuvastatin (CRESTOR) 20 MG tablet Take 1 tablet by mouth every night at bedtime. 90 tablet 3    Vitamin A 3 MG (53118 UT) capsule Take 1 capsule by mouth Daily.      diclofenac (VOLTAREN) 50 MG EC tablet Take 1 tablet by mouth 2 (Two) Times a Day. (Patient not taking: Reported on 5/6/2025) 60 tablet 2    levoFLOXacin (Levaquin) 750 MG tablet Take 1 tablet by mouth Daily. 7 tablet 0     No current facility-administered medications for this visit.       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 2021    SHOULDER SURGERY         Social History:  Social History     Socioeconomic History    Marital status:    Tobacco Use    Smoking status: Never     Passive exposure: Never    Smokeless tobacco: Never   Vaping Use    Vaping status: Never Used   Substance and Sexual Activity    Alcohol use: Yes     Alcohol/week: 8.0 standard drinks of alcohol     Types: 8 Glasses of wine per week     Comment: 10 glasses a week  "   Drug use: No    Sexual activity: Not Currently     Partners: Female       Family History:  Family History   Problem Relation Age of Onset    Leukemia Mother     Fainting Mother     Cancer Mother          from Leukemia in     Aortic stenosis Father     Rheum arthritis Sister     Cancer Sister     No Known Problems Brother     No Known Problems Maternal Grandmother     No Known Problems Maternal Grandfather     No Known Problems Paternal Grandmother     No Known Problems Paternal Grandfather     No Known Problems Sister      Vital Signs:   /68   Pulse 63   Temp 98.6 °F (37 °C) (Infrared)   Ht 177.8 cm (70\")   Wt 91 kg (200 lb 9.6 oz)   BMI 28.78 kg/m²      Physical Exam:  Physical Exam  Vitals reviewed.   Cardiovascular:      Rate and Rhythm: Normal rate and regular rhythm.   Pulmonary:      Effort: Pulmonary effort is normal.      Breath sounds: Normal breath sounds. No wheezing or rales.   Psychiatric:         Mood and Affect: Mood normal.         Behavior: Behavior normal.         Thought Content: Thought content normal.         Judgment: Judgment normal.               PHQ-9 Score  PHQ-9 Total Score:      Lab Review  Lab on 2025   Component Date Value Ref Range Status    Total Cholesterol 2025 146  0 - 200 mg/dL Final    Triglycerides 2025 154 (H)  0 - 150 mg/dL Final    HDL Cholesterol 2025 43  40 - 60 mg/dL Final    LDL Cholesterol  2025 76  0 - 100 mg/dL Final    VLDL Cholesterol 2025 27  5 - 40 mg/dL Final    LDL/HDL Ratio 2025 1.68   Final    Glucose 2025 89  65 - 99 mg/dL Final    BUN 2025 18  8 - 23 mg/dL Final    Creatinine 2025 1.19  0.76 - 1.27 mg/dL Final    Sodium 2025 142  136 - 145 mmol/L Final    Potassium 2025 4.4  3.5 - 5.2 mmol/L Final    Slight hemolysis detected by analyzer. Result may be falsely elevated.    Chloride 2025 107  98 - 107 mmol/L Final    CO2 2025 23.2  22.0 - 29.0 mmol/L Final "    Calcium 02/17/2025 9.4  8.6 - 10.5 mg/dL Final    Total Protein 02/17/2025 7.6  6.0 - 8.5 g/dL Final    Albumin 02/17/2025 4.1  3.5 - 5.2 g/dL Final    ALT (SGPT) 02/17/2025 30  1 - 41 U/L Final    AST (SGOT) 02/17/2025 30  1 - 40 U/L Final    Alkaline Phosphatase 02/17/2025 62  39 - 117 U/L Final    Total Bilirubin 02/17/2025 0.5  0.0 - 1.2 mg/dL Final    Globulin 02/17/2025 3.5  gm/dL Final    A/G Ratio 02/17/2025 1.2  g/dL Final    BUN/Creatinine Ratio 02/17/2025 15.1  7.0 - 25.0 Final    Anion Gap 02/17/2025 11.8  5.0 - 15.0 mmol/L Final    eGFR 02/17/2025 67.4  >60.0 mL/min/1.73 Final    25 Hydroxy, Vitamin D 02/17/2025 30.1  30.0 - 100.0 ng/ml Final    PSA 02/17/2025 7.320 (H)  0.000 - 4.000 ng/mL Final        Radiology Results        Assessment / Plan         Assessment and Plan   Diagnoses and all orders for this visit:    1. Aspiration pneumonia due to gastric secretions, unspecified laterality, unspecified part of lung (Primary)  -     XR Chest PA & Lateral (In Office); Future  -     levoFLOXacin (Levaquin) 750 MG tablet; Take 1 tablet by mouth Daily.  Dispense: 7 tablet; Refill: 0        Assessment & Plan  1. Suspected aspiration pneumonia.  - No intra procedure notes available at this time. Information of what occurred during procedure based on what was relayed to patient's wife   - He experienced a reflux episode during a colonoscopy, leading to persistent coughing and expectoration of thick, dark mucus. Symptoms include a burning sensation in the mouth, tongue pain, facial discomfort, and a sore throat.  - A low-grade fever and chills were noted last night. The colonoscopy report was reviewed and found to be satisfactory.  - Antibiotics will be prescribed to cover potential aspiration pneumonia. A chest x-ray will be ordered.  - He is advised to take Prilosec 20 mg for at least 2 weeks to manage irritation from the reflux episode and potential antibiotic-related stomach irritation.        Discussed  possible differential diagnoses, testing, treatment, recommended non-pharmacological interventions, risks, warning signs to monitor for that would indicate need for follow-up in clinic or ER. If no improvement with these regimens or you have new or worsening symptoms follow-up. Patient verbalizes understanding and agreement with plan of care. Denies further needs or concerns.     Patient was given instructions and counseling regarding her condition and for health maintenance advised.    BMI is >= 25 and <30. (Overweight) The following options were offered after discussion;: weight loss educational material (shared in after visit summary)    Health Maintenance  Health Maintenance: There are no preventive care reminders to display for this patient.     Meds ordered during this visit  New Medications Ordered This Visit   Medications    levoFLOXacin (Levaquin) 750 MG tablet     Sig: Take 1 tablet by mouth Daily.     Dispense:  7 tablet     Refill:  0       Meds stopped during this visit:  Medications Discontinued During This Encounter   Medication Reason    Sod Picosulfate-Mag Ox-Cit Acd 10-3.5-12 MG-GM -GM/160ML solution         Visit Diagnoses    ICD-10-CM ICD-9-CM   1. Aspiration pneumonia due to gastric secretions, unspecified laterality, unspecified part of lung  J69.0 507.0       Patient was given instructions and counseling regarding his condition or for health maintenance advice. Please see specific information pulled into the AVS if appropriate.     Follow Up   Return for Next scheduled follow up.      Patient or patient representative verbalized consent for the use of Ambient Listening during the visit with  Carmel Simons DO for chart documentation. 5/6/2025  13:53 EDT      This document has been electronically signed by Carmel Simons DO   May 6, 2025 14:15 EDT    Dictated Utilizing Dragon Dictation: Part of this note may be an electronic transcription/translation of spoken language to printed text using the  Dragon Dictation System.    Carmel Simons D.O.  [facility]

## 2025-05-06 NOTE — TELEPHONE ENCOUNTER
Caller: DANYELLE ACUÑA    Relationship to patient: SELF    Best call back number: [phone]    Patient is needing: SEND PT'S NOTES/CHARTING NOTES TO [facility] FOR HIS APPT. TODAY AND SEND IT ALSO TO HIS MYCHART. PT HAD A COLONOSCOPY YESTERDAY,   CHARTING NOTES REQUESTED  BY HIS FAM PRACTIONER. PT HAD LOTS OF REFLUX BURNED MOUTH, NOSE AND EYE, CHARTING NOTES.

## 2025-05-16 ENCOUNTER — TELEPHONE (OUTPATIENT)
Dept: FAMILY MEDICINE CLINIC | Facility: CLINIC | Age: 65
End: 2025-05-16
Payer: MEDICARE

## 2025-05-16 NOTE — TELEPHONE ENCOUNTER
Caller: Jay Guadalupe    Relationship: Self    Best call back number:       [phone] (Mobile)     What is the best time to reach you:     ANY TIME    Who are you requesting to speak with (clinical staff, provider,  specific staff member):     DR PERDUE OR NURSE    What was the call regarding:     PATIENT REQUESTED A CALL BACK WITH CONFIRMATION IF DR PERDUE RECEIVED A FAX FROM GASTROENTEROLOGIST, DR GREENE REGARDING HIS RECENT COLONOSCOPY

## 2025-06-20 ENCOUNTER — OFFICE VISIT (OUTPATIENT)
Dept: FAMILY MEDICINE CLINIC | Facility: CLINIC | Age: 65
End: 2025-06-20
Payer: MEDICARE

## 2025-06-20 VITALS
HEART RATE: 62 BPM | TEMPERATURE: 98.4 F | HEIGHT: 70 IN | BODY MASS INDEX: 28.98 KG/M2 | RESPIRATION RATE: 20 BRPM | SYSTOLIC BLOOD PRESSURE: 120 MMHG | OXYGEN SATURATION: 97 % | WEIGHT: 202.4 LBS | DIASTOLIC BLOOD PRESSURE: 62 MMHG

## 2025-06-20 DIAGNOSIS — R05.9 COUGH, UNSPECIFIED TYPE: Primary | ICD-10-CM

## 2025-06-20 PROCEDURE — 1159F MED LIST DOCD IN RCRD: CPT | Performed by: FAMILY MEDICINE

## 2025-06-20 PROCEDURE — 1126F AMNT PAIN NOTED NONE PRSNT: CPT | Performed by: FAMILY MEDICINE

## 2025-06-20 PROCEDURE — 1160F RVW MEDS BY RX/DR IN RCRD: CPT | Performed by: FAMILY MEDICINE

## 2025-06-20 PROCEDURE — 99213 OFFICE O/P EST LOW 20 MIN: CPT | Performed by: FAMILY MEDICINE

## 2025-06-20 NOTE — PATIENT INSTRUCTIONS
Antibiotic as ordered.  Finish course.  Take Prilosec daily.  Take Xyzal daily.  Try to not eat 2-3 hours before bed.

## 2025-06-20 NOTE — PROGRESS NOTES
Follow Up Office Visit      Date: 2025   Patient Name: Jay Guadalupe  : 1960   MRN: [id number]     Chief Complaint:    Chief Complaint   Patient presents with    Cough     Since he had a colonoscopy. On 2025       History of Present Illness: Jay Guadalupe is a 65 y.o. male who presents today for evaluation of cough that will not go away.  Sees Dr. Simons for PCP.    Cough since colonoscopy  25  Reports had an issue with colonoscopy.  Reports has not been able to get rid of the cough.    Can go hours and cough is minor, but can have episodes that are so forceful and can be hard cough.    Not productive.    No fever or chills.    Reports drainage is no different than always.  Reports he has delt with drainage off and on for a long time.      No known sick contacts.            Answers submitted by the patient for this visit:  Cough Questionnaire (Submitted on 2025)  Chief Complaint: Cough  Onset: more than 1 month ago  Progression since onset: unchanged  Frequency: constantly  Cough characteristics: dry  ear congestion: No  headaches: No  heartburn: No  hemoptysis: No  nasal congestion: No  sweats: No  Please list any additional information about your cough: So hard at times it causes me to loose my balance & black out, especially if I’m standing        Subjective      Review of Systems:   Review of Systems   Constitutional:  Negative for chills, fever and unexpected weight loss.   HENT:  Negative for ear pain, postnasal drip, rhinorrhea and sore throat.    Respiratory:  Positive for cough. Negative for shortness of breath and wheezing.    Cardiovascular:  Negative for chest pain.   Gastrointestinal:  Negative for diarrhea, nausea and vomiting.   Musculoskeletal:  Negative for myalgias.   Skin:  Negative for rash.       I have reviewed the patients family history, social history, past medical history, past surgical history and have updated it as appropriate.     Medications:  "    Current Outpatient Medications:     alfuzosin (UROXATRAL) 10 MG 24 hr tablet, Take 1 tablet by mouth Daily., Disp: , Rfl:     aspirin 81 MG chewable tablet, Chew 1 tablet Daily., Disp: 100 tablet, Rfl: 3    ezetimibe (ZETIA) 10 MG tablet, Take 1 tablet by mouth Daily., Disp: 90 tablet, Rfl: 3    finasteride (PROSCAR) 5 MG tablet, Daily., Disp: , Rfl:     Lidocaine Viscous HCl (XYLOCAINE) 2 % solution, , Disp: , Rfl:     nitroglycerin (NITROSTAT) 0.4 MG SL tablet, Place 1 under the tongue as needed for chest pain, may repeat every 5 minutes for up three doses, Disp: 100 tablet, Rfl: 1    omeprazole (priLOSEC) 20 MG capsule, TAKE 1 CAPSULE BY MOUTH EVERY DAY, Disp: 90 capsule, Rfl: 0    polyethylene glycol (GoLYTELY) 236 g solution, AS DIRECTED, Disp: 4000 mL, Rfl: 0    rosuvastatin (CRESTOR) 20 MG tablet, Take 1 tablet by mouth every night at bedtime., Disp: 90 tablet, Rfl: 3    Vitamin A 3 MG (99871 UT) capsule, Take 1 capsule by mouth Daily., Disp: , Rfl:     amoxicillin-clavulanate (AUGMENTIN) 875-125 MG per tablet, Take 1 tablet by mouth 2 (Two) Times a Day for 7 days., Disp: 14 tablet, Rfl: 0    diclofenac (VOLTAREN) 50 MG EC tablet, Take 1 tablet by mouth 2 (Two) Times a Day. (Patient not taking: Reported on 6/20/2025), Disp: 60 tablet, Rfl: 2    levoFLOXacin (Levaquin) 750 MG tablet, Take 1 tablet by mouth Daily. (Patient not taking: Reported on 6/20/2025), Disp: 7 tablet, Rfl: 0    Allergies:   No Known Allergies    Objective     Physical Exam: Please see above  Vital Signs:   Vitals:    06/20/25 1521   BP: 120/62   BP Location: Right arm   Patient Position: Sitting   Cuff Size: Adult   Pulse: 62   Resp: 20   Temp: 98.4 °F (36.9 °C)   TempSrc: Temporal   SpO2: 97%   Weight: 91.8 kg (202 lb 6.4 oz)   Height: 177.8 cm (70\")   PainSc: 0-No pain     Body mass index is 29.04 kg/m².          Physical Exam  Vitals and nursing note reviewed.   Constitutional:       Appearance: Normal appearance.   HENT:      " Head: Normocephalic and atraumatic.   Neck:      Vascular: No carotid bruit.   Cardiovascular:      Rate and Rhythm: Normal rate and regular rhythm.      Heart sounds: Normal heart sounds. No murmur heard.  Pulmonary:      Effort: Pulmonary effort is normal.      Breath sounds: Normal breath sounds.   Abdominal:      General: Bowel sounds are normal.      Palpations: Abdomen is soft. There is no mass.      Tenderness: There is no abdominal tenderness.   Musculoskeletal:      Right lower leg: No edema.      Left lower leg: No edema.   Skin:     Coloration: Skin is not jaundiced or pale.      Findings: No erythema.   Neurological:      Mental Status: He is alert. Mental status is at baseline.   Psychiatric:         Mood and Affect: Mood normal.         Behavior: Behavior normal.         Procedures    Results:   Labs:   Hemoglobin A1C   Date Value Ref Range Status   09/18/2023 5.60 4.80 - 5.60 % Final     TSH   Date Value Ref Range Status   10/14/2024 2.030 0.270 - 4.200 uIU/mL Final        POCT Results (if applicable):   Results for orders placed or performed in visit on 02/17/25   Lipid Panel    Collection Time: 02/17/25 11:59 AM    Specimen: Blood   Result Value Ref Range    Total Cholesterol 146 0 - 200 mg/dL    Triglycerides 154 (H) 0 - 150 mg/dL    HDL Cholesterol 43 40 - 60 mg/dL    LDL Cholesterol  76 0 - 100 mg/dL    VLDL Cholesterol 27 5 - 40 mg/dL    LDL/HDL Ratio 1.68    Comprehensive Metabolic Panel    Collection Time: 02/17/25 11:59 AM    Specimen: Blood   Result Value Ref Range    Glucose 89 65 - 99 mg/dL    BUN 18 8 - 23 mg/dL    Creatinine 1.19 0.76 - 1.27 mg/dL    Sodium 142 136 - 145 mmol/L    Potassium 4.4 3.5 - 5.2 mmol/L    Chloride 107 98 - 107 mmol/L    CO2 23.2 22.0 - 29.0 mmol/L    Calcium 9.4 8.6 - 10.5 mg/dL    Total Protein 7.6 6.0 - 8.5 g/dL    Albumin 4.1 3.5 - 5.2 g/dL    ALT (SGPT) 30 1 - 41 U/L    AST (SGOT) 30 1 - 40 U/L    Alkaline Phosphatase 62 39 - 117 U/L    Total Bilirubin 0.5  0.0 - 1.2 mg/dL    Globulin 3.5 gm/dL    A/G Ratio 1.2 g/dL    BUN/Creatinine Ratio 15.1 7.0 - 25.0    Anion Gap 11.8 5.0 - 15.0 mmol/L    eGFR 67.4 >60.0 mL/min/1.73   Vitamin D,25-Hydroxy    Collection Time: 02/17/25 11:59 AM    Specimen: Blood   Result Value Ref Range    25 Hydroxy, Vitamin D 30.1 30.0 - 100.0 ng/ml   PSA DIAGNOSTIC ONLY    Collection Time: 02/17/25 11:59 AM    Specimen: Blood   Result Value Ref Range    PSA 7.320 (H) 0.000 - 4.000 ng/mL       PHQ-9: PHQ-9 Total Score:       Imaging:   No valid procedures specified.     Measures:   Advanced Care Planning:   Patient has an advance directive (not in EMR), copy requested.    Smoking Cessation:   Does not smoke    Assessment / Plan      Assessment/Plan:     1. Cough, unspecified type  For 6 weeks or so.     Patient to take antibiotic course. Finish course.  Prilosec daily.  Xyzal daily.  Not eat 2-3 hours before lying down.    - amoxicillin-clavulanate (AUGMENTIN) 875-125 MG per tablet; Take 1 tablet by mouth 2 (Two) Times a Day for 7 days.  Dispense: 14 tablet; Refill: 0        Discussed with patient:  Take Augmentin as ordered and finish course.  Take Prilosec daily.  Take Xyzal daily.  Do not eat 2 to 3 hours before lying down        Vaccine Counseling:      Follow Up:   Return if symptoms worsen or fail to improve, or as scheduled with pcp..      DO BLAISE Rodriguez

## 2025-07-30 NOTE — PROGRESS NOTES
[facility] Cardiology    Encounter Date: 2025    Patient ID: Jay Guadalupe is a 65 y.o. male.  : 1960     PCP: Carmel Simons DO       Chief Complaint: Coronary artery disease involving native coronary artery of      PROBLEM LIST:  Coronary artery disease  STEMI, 2021  [facility], 2021: EF 60%. 100% occlusion of infarct-related mid left circumflex coronary artery. Successful thrombectomy followed by PTCA and placement of 3.0 x 23 mm BENITEZ to mid circumflex reducing 100% stenosis to 0%. 90% stenosis of the distal LAD successfully stented with 2.5 x 23 mm BENITEZ postdilated to 3.0 mm and reduced to 0%. Residual mild nonobstructive plaque of the proximal LAD, moderate nonobstructive plaque of the mid RCA and 60 to 70% stenosis of the distal RCA. Mild high lateral hypokinesis.   Echo, 2021: EF 55%. Mild concentric LVH. Mild MR. Trace TR with normal RVSP.   Stress test 2023: Myocardial perfusion imaging indicates a moderate size infarct of the basal inferior lateral wall with no significant ischemia  Dyslipidemia     History of Present Illness  Patient presents today for a follow-up with a history of CAD and cardiac risk factors. Since last visit, patient has been doing well overall from a cardiovascular standpoint. 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 Patient denies chest pain, shortness of breath, orthopnea, palpitations, edema, dizziness, and syncope.     No Known Allergies      Current Outpatient Medications:     alfuzosin (UROXATRAL) 10 MG 24 hr tablet, Take 1 tablet by mouth Daily., Disp: , Rfl:     aspirin 81 MG chewable tablet, Chew 1 tablet Daily., Disp: 100 tablet, Rfl: 3    diclofenac (VOLTAREN) 50 MG EC tablet, Take 1 tablet by mouth 2 (Two) Times a Day., Disp: 60 tablet, Rfl: 2    ezetimibe (ZETIA) 10 MG tablet, Take 1 tablet by mouth Daily., Disp: 90 tablet, Rfl: 3    finasteride (PROSCAR) 5 MG tablet, Daily., Disp: , Rfl:     Lidocaine Viscous HCl  "(XYLOCAINE) 2 % solution, , Disp: , Rfl:     nitroglycerin (NITROSTAT) 0.4 MG SL tablet, Place 1 under the tongue as needed for chest pain, may repeat every 5 minutes for up three doses, Disp: 100 tablet, Rfl: 1    omeprazole (priLOSEC) 20 MG capsule, TAKE 1 CAPSULE BY MOUTH EVERY DAY, Disp: 90 capsule, Rfl: 0    polyethylene glycol (GoLYTELY) 236 g solution, AS DIRECTED, Disp: 4000 mL, Rfl: 0    rosuvastatin (CRESTOR) 20 MG tablet, Take 1 tablet by mouth every night at bedtime., Disp: 90 tablet, Rfl: 3    Vitamin A 3 MG (69380 UT) capsule, Take 1 capsule by mouth Daily., Disp: , Rfl:     The following portions of the patient's history were reviewed and updated as appropriate: allergies, current medications, past family history, past medical history, past social history, past surgical history and problem list.    ROS  Review of Systems   14 point ROS negative except for that listed in the HPI.         Objective:     /62 (BP Location: Left arm, Patient Position: Sitting, Cuff Size: Adult)   Pulse 69   Ht 177.8 cm (70\")   Wt 92.4 kg (203 lb 12.8 oz)   SpO2 98%   BMI 29.24 kg/m²      Physical Exam  Constitutional: Patient appears well-developed and well-nourished.   HENT: HEENT exam unremarkable.   Neck: Neck supple. No JVD present. No carotid bruits.   Cardiovascular: Normal rate, regular rhythm and normal heart sounds. No murmur heard.   2+ symmetric pulses.   Pulmonary/Chest: Breath sounds normal. Does not exhibit tenderness.   Abdominal: Abdomen benign.   Musculoskeletal: Does not exhibit edema.   Neurological: Neurological exam unremarkable.   Vitals reviewed.    Data Review:   Lab Results   Component Value Date    GLUCOSE 89 02/17/2025    BUN 18 02/17/2025    CREATININE 1.19 02/17/2025    EGFR 67.4 02/17/2025    BCR 15.1 02/17/2025     02/17/2025    K 4.4 02/17/2025    CO2 23.2 02/17/2025    CALCIUM 9.4 02/17/2025    ALBUMIN 4.1 02/17/2025    AST 30 02/17/2025    ALT 30 02/17/2025     Lab Results "   Component Value Date    CHOL 146 02/17/2025    TRIG 154 (H) 02/17/2025    HDL 43 02/17/2025    LDL 76 02/17/2025      Lab Results   Component Value Date    WBC 4.25 10/14/2024    RBC 4.36 10/14/2024    HGB 14.4 10/14/2024    HCT 41.4 10/14/2024    MCV 95.0 10/14/2024     10/14/2024     Lab Results   Component Value Date    TSH 2.030 10/14/2024          ECG 12 Lead    Date/Time: 8/1/2025 2:12 PM  Performed by: Zaira Kerns MD    Authorized by: Zaira Kerns MD  Comparison: compared with previous ECG from 3/8/2024  Rhythm: sinus rhythm  BPM: 69    Clinical impression: normal ECG             Advance Care Planning   ACP discussion was held with the patient during this visit. Patient does not have an advance directive, declines further assistance.           Assessment:      Diagnosis Plan   1. Coronary artery disease involving native coronary artery of native heart without angina pectoris  Stable without angina on current activity. Continue on aspirin 81 mg for antiplatelet therapy.       2. Dyslipidemia  Improved control with addition of Zetia, continue on Zetia 10 mg daily for hyperlipidemia. Continue on rosuvastatin 20 mg daily for hyperlipidemia.         Plan:   Stable cardiac status. No current angina or CHF symptoms.   Continue current medications.   Patient asked whether he can come off Crestor, we explained that his LDL is barely at target and ideally we should increase the dose however since he is reluctant we will continue current dose of Crestor with Zetia.  FU in 12 MO, sooner as needed.  Thank you for allowing us to participate in the care of your patient.     Scribed for Zaira Kerns MD by Lucian Washington. 8/1/2025  14:06 EDT    I, Zaira Kerns MD, personally performed the services described in this documentation as scribed by the above named individual in my presence, and it is both accurate and complete.  8/1/2025  14:22 EDT      Please note that portions of this note may have been completed with a  voice recognition program. Efforts were made to edit the dictations, but occasionally words are mistranscribed.

## 2025-08-01 ENCOUNTER — OFFICE VISIT (OUTPATIENT)
Dept: CARDIOLOGY | Facility: CLINIC | Age: 65
End: 2025-08-01
Payer: MEDICARE

## 2025-08-01 VITALS
SYSTOLIC BLOOD PRESSURE: 118 MMHG | BODY MASS INDEX: 29.18 KG/M2 | OXYGEN SATURATION: 98 % | HEIGHT: 70 IN | WEIGHT: 203.8 LBS | DIASTOLIC BLOOD PRESSURE: 70 MMHG | HEART RATE: 69 BPM

## 2025-08-01 DIAGNOSIS — I25.10 CORONARY ARTERY DISEASE INVOLVING NATIVE CORONARY ARTERY OF NATIVE HEART WITHOUT ANGINA PECTORIS: Primary | ICD-10-CM

## 2025-08-01 DIAGNOSIS — E78.5 DYSLIPIDEMIA: ICD-10-CM

## 2025-08-01 PROCEDURE — 1159F MED LIST DOCD IN RCRD: CPT | Performed by: INTERNAL MEDICINE

## 2025-08-01 PROCEDURE — 99214 OFFICE O/P EST MOD 30 MIN: CPT | Performed by: INTERNAL MEDICINE

## 2025-08-01 PROCEDURE — 1160F RVW MEDS BY RX/DR IN RCRD: CPT | Performed by: INTERNAL MEDICINE

## 2025-08-01 PROCEDURE — 93000 ELECTROCARDIOGRAM COMPLETE: CPT | Performed by: INTERNAL MEDICINE

## 2025-08-18 ENCOUNTER — OFFICE VISIT (OUTPATIENT)
Dept: FAMILY MEDICINE CLINIC | Facility: CLINIC | Age: 65
End: 2025-08-18
Payer: MEDICARE

## 2025-08-18 ENCOUNTER — LAB (OUTPATIENT)
Dept: LAB | Facility: HOSPITAL | Age: 65
End: 2025-08-18
Payer: MEDICARE

## 2025-08-18 ENCOUNTER — TELEPHONE (OUTPATIENT)
Dept: FAMILY MEDICINE CLINIC | Facility: CLINIC | Age: 65
End: 2025-08-18

## 2025-08-18 VITALS
WEIGHT: 202.6 LBS | SYSTOLIC BLOOD PRESSURE: 110 MMHG | OXYGEN SATURATION: 96 % | RESPIRATION RATE: 20 BRPM | HEART RATE: 78 BPM | DIASTOLIC BLOOD PRESSURE: 68 MMHG | BODY MASS INDEX: 29.01 KG/M2 | TEMPERATURE: 98.6 F | HEIGHT: 70 IN

## 2025-08-18 DIAGNOSIS — R79.9 ABNORMAL FINDING OF BLOOD CHEMISTRY, UNSPECIFIED: ICD-10-CM

## 2025-08-18 DIAGNOSIS — E78.2 MIXED HYPERLIPIDEMIA: ICD-10-CM

## 2025-08-18 DIAGNOSIS — R97.20 HIGH PROSTATE SPECIFIC ANTIGEN (PSA): ICD-10-CM

## 2025-08-18 DIAGNOSIS — H35.52 RETINITIS PIGMENTOSA: ICD-10-CM

## 2025-08-18 DIAGNOSIS — M77.12 BILATERAL TENNIS ELBOW: Primary | ICD-10-CM

## 2025-08-18 DIAGNOSIS — K21.9 GASTROESOPHAGEAL REFLUX DISEASE WITHOUT ESOPHAGITIS: ICD-10-CM

## 2025-08-18 DIAGNOSIS — M77.11 BILATERAL TENNIS ELBOW: Primary | ICD-10-CM

## 2025-08-18 LAB
ALBUMIN SERPL-MCNC: 4.3 G/DL (ref 3.5–5.2)
ALBUMIN/GLOB SERPL: 1.7 G/DL
ALP SERPL-CCNC: 57 U/L (ref 39–117)
ALT SERPL W P-5'-P-CCNC: 31 U/L (ref 1–41)
ANION GAP SERPL CALCULATED.3IONS-SCNC: 11.6 MMOL/L (ref 5–15)
AST SERPL-CCNC: 27 U/L (ref 1–40)
BASOPHILS # BLD AUTO: 0.02 10*3/MM3 (ref 0–0.2)
BASOPHILS NFR BLD AUTO: 0.5 % (ref 0–1.5)
BILIRUB SERPL-MCNC: 0.3 MG/DL (ref 0–1.2)
BUN SERPL-MCNC: 17 MG/DL (ref 8–23)
BUN/CREAT SERPL: 16.7 (ref 7–25)
CALCIUM SPEC-SCNC: 8.8 MG/DL (ref 8.6–10.5)
CHLORIDE SERPL-SCNC: 107 MMOL/L (ref 98–107)
CHOLEST SERPL-MCNC: 188 MG/DL (ref 0–200)
CO2 SERPL-SCNC: 22.4 MMOL/L (ref 22–29)
CREAT SERPL-MCNC: 1.02 MG/DL (ref 0.76–1.27)
DEPRECATED RDW RBC AUTO: 43.5 FL (ref 37–54)
EGFRCR SERPLBLD CKD-EPI 2021: 80.6 ML/MIN/1.73
EOSINOPHIL # BLD AUTO: 0.06 10*3/MM3 (ref 0–0.4)
EOSINOPHIL NFR BLD AUTO: 1.6 % (ref 0.3–6.2)
ERYTHROCYTE [DISTWIDTH] IN BLOOD BY AUTOMATED COUNT: 12.9 % (ref 12.3–15.4)
GLOBULIN UR ELPH-MCNC: 2.5 GM/DL
GLUCOSE SERPL-MCNC: 92 MG/DL (ref 65–99)
HBA1C MFR BLD: 5.4 % (ref 4.8–5.6)
HCT VFR BLD AUTO: 41.3 % (ref 37.5–51)
HDLC SERPL-MCNC: 35 MG/DL (ref 40–60)
HGB BLD-MCNC: 13.9 G/DL (ref 13–17.7)
IMM GRANULOCYTES # BLD AUTO: 0.01 10*3/MM3 (ref 0–0.05)
IMM GRANULOCYTES NFR BLD AUTO: 0.3 % (ref 0–0.5)
LDLC SERPL CALC-MCNC: 109 MG/DL (ref 0–100)
LDLC/HDLC SERPL: 2.9 {RATIO}
LYMPHOCYTES # BLD AUTO: 1.39 10*3/MM3 (ref 0.7–3.1)
LYMPHOCYTES NFR BLD AUTO: 36.7 % (ref 19.6–45.3)
MCH RBC QN AUTO: 31.7 PG (ref 26.6–33)
MCHC RBC AUTO-ENTMCNC: 33.7 G/DL (ref 31.5–35.7)
MCV RBC AUTO: 94.1 FL (ref 79–97)
MONOCYTES # BLD AUTO: 0.43 10*3/MM3 (ref 0.1–0.9)
MONOCYTES NFR BLD AUTO: 11.3 % (ref 5–12)
NEUTROPHILS NFR BLD AUTO: 1.88 10*3/MM3 (ref 1.7–7)
NEUTROPHILS NFR BLD AUTO: 49.6 % (ref 42.7–76)
NRBC BLD AUTO-RTO: 0 /100 WBC (ref 0–0.2)
PLATELET # BLD AUTO: 185 10*3/MM3 (ref 140–450)
PMV BLD AUTO: 9.8 FL (ref 6–12)
POTASSIUM SERPL-SCNC: 4.2 MMOL/L (ref 3.5–5.2)
PROT SERPL-MCNC: 6.8 G/DL (ref 6–8.5)
PSA SERPL-MCNC: 8.29 NG/ML (ref 0–4)
RBC # BLD AUTO: 4.39 10*6/MM3 (ref 4.14–5.8)
SODIUM SERPL-SCNC: 141 MMOL/L (ref 136–145)
TRIGL SERPL-MCNC: 257 MG/DL (ref 0–150)
TSH SERPL DL<=0.05 MIU/L-ACNC: 2.19 UIU/ML (ref 0.27–4.2)
VLDLC SERPL-MCNC: 44 MG/DL (ref 5–40)
WBC NRBC COR # BLD AUTO: 3.79 10*3/MM3 (ref 3.4–10.8)

## 2025-08-18 PROCEDURE — 85025 COMPLETE CBC W/AUTO DIFF WBC: CPT

## 2025-08-18 PROCEDURE — 1125F AMNT PAIN NOTED PAIN PRSNT: CPT | Performed by: FAMILY MEDICINE

## 2025-08-18 PROCEDURE — 1160F RVW MEDS BY RX/DR IN RCRD: CPT | Performed by: FAMILY MEDICINE

## 2025-08-18 PROCEDURE — 84443 ASSAY THYROID STIM HORMONE: CPT

## 2025-08-18 PROCEDURE — 84153 ASSAY OF PSA TOTAL: CPT

## 2025-08-18 PROCEDURE — 83036 HEMOGLOBIN GLYCOSYLATED A1C: CPT

## 2025-08-18 PROCEDURE — 80053 COMPREHEN METABOLIC PANEL: CPT

## 2025-08-18 PROCEDURE — G2211 COMPLEX E/M VISIT ADD ON: HCPCS | Performed by: FAMILY MEDICINE

## 2025-08-18 PROCEDURE — 36415 COLL VENOUS BLD VENIPUNCTURE: CPT

## 2025-08-18 PROCEDURE — 1159F MED LIST DOCD IN RCRD: CPT | Performed by: FAMILY MEDICINE

## 2025-08-18 PROCEDURE — 80061 LIPID PANEL: CPT

## 2025-08-18 PROCEDURE — 99214 OFFICE O/P EST MOD 30 MIN: CPT | Performed by: FAMILY MEDICINE

## 2025-08-18 RX ORDER — DICLOFENAC EPOLAMINE 0.01 G/1
1 SYSTEM TOPICAL 2 TIMES DAILY
Qty: 60 PATCH | Refills: 5 | Status: SHIPPED | OUTPATIENT
Start: 2025-08-18

## 2025-08-20 ENCOUNTER — PRIOR AUTHORIZATION (OUTPATIENT)
Dept: FAMILY MEDICINE CLINIC | Facility: CLINIC | Age: 65
End: 2025-08-20
Payer: MEDICARE

## (undated) DEVICE — GUIDE CATHETER: Brand: MACH1™

## (undated) DEVICE — MINI TREK CORONARY DILATATION CATHETER 2.0 MM X 15 MM / RAPID-EXCHANGE: Brand: MINI TREK

## (undated) DEVICE — DEV INFL MONARCH 25W

## (undated) DEVICE — MODEL BT2000 P/N 700287-012KIT CONTENTS: MANIFOLD WITH SALINE AND CONTRAST PORTS, SALINE TUBING WITH SPIKE AND HAND SYRINGE, TRANSDUCER: Brand: BT2000 AUTOMATED MANIFOLD KIT

## (undated) DEVICE — MODEL AT P65, P/N 701554-001KIT CONTENTS: HAND CONTROLLER, 3-WAY HIGH-PRESSURE STOPCOCK WITH ROTATING END AND PREMIUM HIGH-PRESSURE TUBING: Brand: ANGIOTOUCH® KIT

## (undated) DEVICE — PK CATH CARD 10

## (undated) DEVICE — CATH DIAG EXPO M/ PK 6FR FL4/FR4 PIG 3PK

## (undated) DEVICE — GW LUGE .014 182 CM

## (undated) DEVICE — NC TREK CORONARY DILATATION CATHETER 2.5 MM X 12 MM / RAPID-EXCHANGE: Brand: NC TREK

## (undated) DEVICE — PINNACLE INTRODUCER SHEATH: Brand: PINNACLE

## (undated) DEVICE — CATH ASPIR EXPORT AP .014IN 6F140CM

## (undated) DEVICE — ANGIO-SEAL EVOLUTION VASCULAR CLOSURE DEVICE: Brand: ANGIO-SEAL